# Patient Record
Sex: FEMALE | Race: WHITE | NOT HISPANIC OR LATINO | Employment: UNEMPLOYED | URBAN - METROPOLITAN AREA
[De-identification: names, ages, dates, MRNs, and addresses within clinical notes are randomized per-mention and may not be internally consistent; named-entity substitution may affect disease eponyms.]

---

## 2024-10-09 ENCOUNTER — PRE-ADMISSION TESTING (OUTPATIENT)
Dept: PREADMISSION TESTING | Facility: HOSPITAL | Age: 59
End: 2024-10-09
Payer: MEDICAID

## 2024-10-09 DIAGNOSIS — I67.1 CEREBRAL ANEURYSM, NONRUPTURED: ICD-10-CM

## 2024-10-09 LAB
ANION GAP SERPL CALCULATED.3IONS-SCNC: 13 MMOL/L (ref 5–15)
BUN SERPL-MCNC: 12 MG/DL (ref 6–20)
BUN/CREAT SERPL: 15 (ref 7–25)
CALCIUM SPEC-SCNC: 9.7 MG/DL (ref 8.6–10.5)
CHLORIDE SERPL-SCNC: 105 MMOL/L (ref 98–107)
CO2 SERPL-SCNC: 23 MMOL/L (ref 22–29)
CREAT SERPL-MCNC: 0.8 MG/DL (ref 0.57–1)
DEPRECATED RDW RBC AUTO: 41 FL (ref 37–54)
EGFRCR SERPLBLD CKD-EPI 2021: 86.1 ML/MIN/1.73
ERYTHROCYTE [DISTWIDTH] IN BLOOD BY AUTOMATED COUNT: 11.9 % (ref 12.3–15.4)
GLUCOSE SERPL-MCNC: 95 MG/DL (ref 65–99)
HBA1C MFR BLD: 5.1 % (ref 4.8–5.6)
HCT VFR BLD AUTO: 42.7 % (ref 34–46.6)
HGB BLD-MCNC: 14.4 G/DL (ref 12–15.9)
INR PPP: 0.92 (ref 0.89–1.12)
MCH RBC QN AUTO: 31.6 PG (ref 26.6–33)
MCHC RBC AUTO-ENTMCNC: 33.7 G/DL (ref 31.5–35.7)
MCV RBC AUTO: 93.6 FL (ref 79–97)
PLATELET # BLD AUTO: 267 10*3/MM3 (ref 140–450)
PMV BLD AUTO: 9.7 FL (ref 6–12)
POTASSIUM SERPL-SCNC: 4.1 MMOL/L (ref 3.5–5.2)
PROTHROMBIN TIME: 12.4 SECONDS (ref 12.2–14.5)
RBC # BLD AUTO: 4.56 10*6/MM3 (ref 3.77–5.28)
SODIUM SERPL-SCNC: 141 MMOL/L (ref 136–145)
WBC NRBC COR # BLD AUTO: 5.98 10*3/MM3 (ref 3.4–10.8)

## 2024-10-09 PROCEDURE — 85610 PROTHROMBIN TIME: CPT

## 2024-10-09 PROCEDURE — 83036 HEMOGLOBIN GLYCOSYLATED A1C: CPT

## 2024-10-09 PROCEDURE — 93005 ELECTROCARDIOGRAM TRACING: CPT

## 2024-10-09 PROCEDURE — 85027 COMPLETE CBC AUTOMATED: CPT

## 2024-10-09 PROCEDURE — 36415 COLL VENOUS BLD VENIPUNCTURE: CPT

## 2024-10-09 PROCEDURE — 80048 BASIC METABOLIC PNL TOTAL CA: CPT

## 2024-10-09 NOTE — DISCHARGE INSTRUCTIONS
"Dear Patient,    Do NOT eat, drink, or smoke after midnight the night before your procedure.   Take your medications as instructed by your doctor.    Glasses and jewelry may be worn, but dentures must be removed prior to your procedure.    Leave any items you consider valuable at home.      MORNING of your Procedure, please bring the following:     -Photo ID and insurance card(s)    -ALL medications in their ORIGINAL CONTAINERS    -Co-pay and/or deductible required by your insurance   -Copy of living will or power of  document (if not brought to    Pre-Admission Testing department)   -CPAP mask and tubing, not your machine (if applicable)    -Relaxation aids (music, books, magazines)   -Skin Prep Instruction Sheet (if applicable)   -Relaxation Aids    Check in on the 2nd floor in the [address] [facility] [address].  Your procedure will be performed in the cath lab or EP lab.  During your procedure, your family will wait in the cath lab waiting area where you checked in.      Need to make arrangements for transportation prior to discharge.    A handout regarding \"Heart Healthy Eating\" was provided today to encourage healthy eating habits.    Booklet published by Esther was given in Pre-Admission testing.  This booklet is for informational purposes only.  If you have any questions about your procedure, please speak with your physician.      Please note:  If you are scheduled to have one of the following procedures: Pulmonary Vein Ablation, Lead Extraction, MitraClip, Cerebral Coilings or Embolization, please let your family know that after your procedure you will be going to recovery unit on the 2nd floor of the [facility] [address].  When the physician is finished speaking with your family after your procedure is completed, your family will be directed or escorted to the surgery waiting area in the [facility] [address].  This is where your family will wait until you are given a room assignment and then your family will be directed to the " appropriate unit.

## 2024-10-09 NOTE — PAT
An arrival time for procedure was not provided during PAT visit. If patient had any questions or concerns about their arrival time, they were instructed to contact their surgeon/physician.  Additionally, if the patient referred to an arrival time that was acquired from their my chart account, patient was encouraged to verify that time with their surgeon/physician. Arrival times are NOT provided in Pre Admission Testing Department.    Patient to apply Chlorhexadine wipes  to surgical area (as instructed) the night before procedure and the AM of procedure. Wipes provided.    Patient denies any current skin issues.     Patient viewed general PAT education video as instructed in their preoperative information received from their surgeon.  Patient stated the general PAT education video was viewed in its entirety and survey completed.  Copies of PAT general education handouts (Incentive Spirometry, Meds to Beds Program, Patient Belongings, Pre-op skin preparation instructions, Blood Glucose testing, Visitor policy, Surgery FAQ, Code H) distributed to patient if not printed. Education related to the PAT pass and skin preparation for surgery (if applicable) completed in PAT as a reinforcement to PAT education video. Patient instructed to return PAT pass provided today as well as completed skin preparation sheet (if applicable) on the day of procedure.     Additionally if patient had not viewed video yet but intended to view it at home or in our waiting area, then referred them to the handout with QR code/link provided during PAT visit.  Encouraged patient/family to read PAT general education handouts thoroughly and notify PAT staff with any questions or concerns. Patient verbalized understanding of all information and priority content.    PATIENT EKG ON CHART AND IN EPIC FROM 10/09/2024

## 2024-10-10 LAB
QT INTERVAL: 412 MS
QTC INTERVAL: 447 MS

## 2024-10-10 RX ORDER — CLOPIDOGREL BISULFATE 75 MG/1
75 TABLET ORAL DAILY
Qty: 90 TABLET | Refills: 1 | Status: CANCELLED | OUTPATIENT
Start: 2024-10-10

## 2024-10-10 NOTE — TELEPHONE ENCOUNTER
Provider: Shon    Surgery/Procedure:  Procedure with Hilton Menendez MD (10/15/2024)     Last visit:   Office Visit with Hilton Menendez MD (09/24/2024)   Next visit: Procedure with Hilton Menendez MD (10/15/2024)      Reason for call: Pt LVM stating that she has misplaced her prescription bottle for Plavix and is requesting another prescription be sent to pharmacy    Reason for call:         Requested Prescriptions     Pending Prescriptions Disp Refills    clopidogrel (Plavix) 75 MG tablet 90 tablet 1     Sig: Take 1 tablet by mouth Daily.      Spoke with [facility] in [place], [place] and explained the situation. Was told by pharmacist that patient has a refill and can pick it up but would have to pay for it out of pocket as her insurance will not cover it. Called pt and informed her that she will need to talk to Pharmacy regarding getting medication refilled.Pt verbalized understanding and was thankful for the return call.

## 2024-10-10 NOTE — TELEPHONE ENCOUNTER
Pt is aware of importance of taking medication per our previous discussion and will call back if she has any issues with the pharmacy filling medication.

## 2024-10-15 ENCOUNTER — HOSPITAL ENCOUNTER (INPATIENT)
Facility: HOSPITAL | Age: 59
LOS: 1 days | Discharge: HOME | End: 2024-10-16
Attending: STUDENT IN AN ORGANIZED HEALTH CARE EDUCATION/TRAINING PROGRAM | Admitting: STUDENT IN AN ORGANIZED HEALTH CARE EDUCATION/TRAINING PROGRAM
Payer: MEDICAID

## 2024-10-15 ENCOUNTER — ANESTHESIA EVENT (OUTPATIENT)
Dept: CARDIOLOGY | Facility: HOSPITAL | Age: 59
End: 2024-10-15
Payer: MEDICAID

## 2024-10-15 ENCOUNTER — ANESTHESIA (OUTPATIENT)
Dept: CARDIOLOGY | Facility: HOSPITAL | Age: 59
End: 2024-10-15
Payer: MEDICAID

## 2024-10-15 DIAGNOSIS — I67.1 CEREBRAL ANEURYSM, NONRUPTURED: ICD-10-CM

## 2024-10-15 LAB
ANION GAP SERPL CALCULATED.3IONS-SCNC: 9 MMOL/L (ref 5–15)
BUN SERPL-MCNC: 12 MG/DL (ref 6–20)
BUN/CREAT SERPL: 18.5 (ref 7–25)
CALCIUM SPEC-SCNC: 8.4 MG/DL (ref 8.6–10.5)
CHLORIDE SERPL-SCNC: 109 MMOL/L (ref 98–107)
CO2 SERPL-SCNC: 21 MMOL/L (ref 22–29)
CREAT SERPL-MCNC: 0.65 MG/DL (ref 0.57–1)
EGFRCR SERPLBLD CKD-EPI 2021: 102.8 ML/MIN/1.73
GLUCOSE SERPL-MCNC: 127 MG/DL (ref 65–99)
HCT VFR BLD AUTO: 37.1 % (ref 34–46.6)
HGB BLD-MCNC: 12.9 G/DL (ref 12–15.9)
PA ADP PRP-ACNC: 22 PRU
PA ADP PRP-ACNC: 34 PRU
POTASSIUM SERPL-SCNC: 4.6 MMOL/L (ref 3.5–5.2)
SODIUM SERPL-SCNC: 139 MMOL/L (ref 136–145)

## 2024-10-15 PROCEDURE — S0260 H&P FOR SURGERY: HCPCS | Performed by: STUDENT IN AN ORGANIZED HEALTH CARE EDUCATION/TRAINING PROGRAM

## 2024-10-15 PROCEDURE — 25010000002 PHENYLEPHRINE 10 MG/ML SOLUTION: Performed by: NURSE ANESTHETIST, CERTIFIED REGISTERED

## 2024-10-15 PROCEDURE — 61624 TCAT PERM OCCLS/EMBOLJ CNS: CPT | Performed by: STUDENT IN AN ORGANIZED HEALTH CARE EDUCATION/TRAINING PROGRAM

## 2024-10-15 PROCEDURE — C1894 INTRO/SHEATH, NON-LASER: HCPCS | Performed by: STUDENT IN AN ORGANIZED HEALTH CARE EDUCATION/TRAINING PROGRAM

## 2024-10-15 PROCEDURE — 76377 3D RENDER W/INTRP POSTPROCES: CPT | Performed by: STUDENT IN AN ORGANIZED HEALTH CARE EDUCATION/TRAINING PROGRAM

## 2024-10-15 PROCEDURE — 25010000002 PHENYLEPHRINE 10 MG/ML SOLUTION 1 ML VIAL: Performed by: NURSE ANESTHETIST, CERTIFIED REGISTERED

## 2024-10-15 PROCEDURE — 25010000002 DEXAMETHASONE PER 1 MG: Performed by: NURSE ANESTHETIST, CERTIFIED REGISTERED

## 2024-10-15 PROCEDURE — C1769 GUIDE WIRE: HCPCS | Performed by: STUDENT IN AN ORGANIZED HEALTH CARE EDUCATION/TRAINING PROGRAM

## 2024-10-15 PROCEDURE — 25010000002 LIDOCAINE PF 1% 1 % SOLUTION: Performed by: NURSE ANESTHETIST, CERTIFIED REGISTERED

## 2024-10-15 PROCEDURE — 25810000003 SODIUM CHLORIDE 0.9 % SOLUTION: Performed by: STUDENT IN AN ORGANIZED HEALTH CARE EDUCATION/TRAINING PROGRAM

## 2024-10-15 PROCEDURE — 75898 FOLLOW-UP ANGIOGRAPHY: CPT | Performed by: STUDENT IN AN ORGANIZED HEALTH CARE EDUCATION/TRAINING PROGRAM

## 2024-10-15 PROCEDURE — 25010000002 PROPOFOL 10 MG/ML EMULSION: Performed by: NURSE ANESTHETIST, CERTIFIED REGISTERED

## 2024-10-15 PROCEDURE — C1760 CLOSURE DEV, VASC: HCPCS | Performed by: STUDENT IN AN ORGANIZED HEALTH CARE EDUCATION/TRAINING PROGRAM

## 2024-10-15 PROCEDURE — 25010000002 SUGAMMADEX 200 MG/2ML SOLUTION: Performed by: NURSE ANESTHETIST, CERTIFIED REGISTERED

## 2024-10-15 PROCEDURE — 25010000002 FENTANYL CITRATE (PF) 50 MCG/ML SOLUTION: Performed by: NURSE ANESTHETIST, CERTIFIED REGISTERED

## 2024-10-15 PROCEDURE — 25010000002 HEPARIN (PORCINE) PER 1000 UNITS: Performed by: STUDENT IN AN ORGANIZED HEALTH CARE EDUCATION/TRAINING PROGRAM

## 2024-10-15 PROCEDURE — C1887 CATHETER, GUIDING: HCPCS | Performed by: STUDENT IN AN ORGANIZED HEALTH CARE EDUCATION/TRAINING PROGRAM

## 2024-10-15 PROCEDURE — 25810000003 SODIUM CHLORIDE 0.9 % SOLUTION 250 ML FLEX CONT: Performed by: NURSE ANESTHETIST, CERTIFIED REGISTERED

## 2024-10-15 PROCEDURE — 99222 1ST HOSP IP/OBS MODERATE 55: CPT | Performed by: INTERNAL MEDICINE

## 2024-10-15 PROCEDURE — 0 IODIXANOL PER 1 ML: Performed by: STUDENT IN AN ORGANIZED HEALTH CARE EDUCATION/TRAINING PROGRAM

## 2024-10-15 PROCEDURE — 85576 BLOOD PLATELET AGGREGATION: CPT | Performed by: STUDENT IN AN ORGANIZED HEALTH CARE EDUCATION/TRAINING PROGRAM

## 2024-10-15 PROCEDURE — 85018 HEMOGLOBIN: CPT | Performed by: STUDENT IN AN ORGANIZED HEALTH CARE EDUCATION/TRAINING PROGRAM

## 2024-10-15 PROCEDURE — 75894 X-RAYS TRANSCATH THERAPY: CPT | Performed by: STUDENT IN AN ORGANIZED HEALTH CARE EDUCATION/TRAINING PROGRAM

## 2024-10-15 PROCEDURE — 03VK3HZ RESTRICTION OF RIGHT INTERNAL CAROTID ARTERY WITH INTRALUMINAL DEVICE, FLOW DIVERTER, PERCUTANEOUS APPROACH: ICD-10-PCS | Performed by: STUDENT IN AN ORGANIZED HEALTH CARE EDUCATION/TRAINING PROGRAM

## 2024-10-15 PROCEDURE — C1766 INTRO/SHEATH,STRBLE,NON-PEEL: HCPCS | Performed by: STUDENT IN AN ORGANIZED HEALTH CARE EDUCATION/TRAINING PROGRAM

## 2024-10-15 PROCEDURE — 36224 PLACE CATH CAROTD ART: CPT | Performed by: STUDENT IN AN ORGANIZED HEALTH CARE EDUCATION/TRAINING PROGRAM

## 2024-10-15 PROCEDURE — 85014 HEMATOCRIT: CPT | Performed by: STUDENT IN AN ORGANIZED HEALTH CARE EDUCATION/TRAINING PROGRAM

## 2024-10-15 PROCEDURE — 80048 BASIC METABOLIC PNL TOTAL CA: CPT | Performed by: STUDENT IN AN ORGANIZED HEALTH CARE EDUCATION/TRAINING PROGRAM

## 2024-10-15 PROCEDURE — B3161ZZ FLUOROSCOPY OF RIGHT INTERNAL CAROTID ARTERY USING LOW OSMOLAR CONTRAST: ICD-10-PCS | Performed by: STUDENT IN AN ORGANIZED HEALTH CARE EDUCATION/TRAINING PROGRAM

## 2024-10-15 PROCEDURE — 25010000002 ONDANSETRON PER 1 MG: Performed by: NURSE ANESTHETIST, CERTIFIED REGISTERED

## 2024-10-15 RX ORDER — MIRTAZAPINE 15 MG/1
15 TABLET, FILM COATED ORAL NIGHTLY
Status: DISCONTINUED | OUTPATIENT
Start: 2024-10-15 | End: 2024-10-16 | Stop reason: HOSPADM

## 2024-10-15 RX ORDER — HEPARIN SODIUM 1000 [USP'U]/ML
INJECTION, SOLUTION INTRAVENOUS; SUBCUTANEOUS
Status: DISCONTINUED | OUTPATIENT
Start: 2024-10-15 | End: 2024-10-15 | Stop reason: HOSPADM

## 2024-10-15 RX ORDER — PHENYLEPHRINE HYDROCHLORIDE 10 MG/ML
INJECTION INTRAVENOUS AS NEEDED
Status: DISCONTINUED | OUTPATIENT
Start: 2024-10-15 | End: 2024-10-15 | Stop reason: SURG

## 2024-10-15 RX ORDER — DROPERIDOL 2.5 MG/ML
0.62 INJECTION, SOLUTION INTRAMUSCULAR; INTRAVENOUS ONCE AS NEEDED
Status: DISCONTINUED | OUTPATIENT
Start: 2024-10-15 | End: 2024-10-15 | Stop reason: HOSPADM

## 2024-10-15 RX ORDER — FENTANYL CITRATE 50 UG/ML
50 INJECTION, SOLUTION INTRAMUSCULAR; INTRAVENOUS
Status: DISCONTINUED | OUTPATIENT
Start: 2024-10-15 | End: 2024-10-15 | Stop reason: HOSPADM

## 2024-10-15 RX ORDER — PROPOFOL 10 MG/ML
VIAL (ML) INTRAVENOUS AS NEEDED
Status: DISCONTINUED | OUTPATIENT
Start: 2024-10-15 | End: 2024-10-15 | Stop reason: SURG

## 2024-10-15 RX ORDER — ONDANSETRON 2 MG/ML
4 INJECTION INTRAMUSCULAR; INTRAVENOUS EVERY 6 HOURS PRN
Status: DISCONTINUED | OUTPATIENT
Start: 2024-10-15 | End: 2024-10-16 | Stop reason: HOSPADM

## 2024-10-15 RX ORDER — VENLAFAXINE HYDROCHLORIDE 37.5 MG/1
37.5 CAPSULE, EXTENDED RELEASE ORAL DAILY
Status: DISCONTINUED | OUTPATIENT
Start: 2024-10-16 | End: 2024-10-16 | Stop reason: HOSPADM

## 2024-10-15 RX ORDER — ASPIRIN 81 MG/1
TABLET, CHEWABLE ORAL
Status: COMPLETED
Start: 2024-10-15 | End: 2024-10-15

## 2024-10-15 RX ORDER — NITROGLYCERIN 0.4 MG/1
0.4 TABLET SUBLINGUAL
Status: DISCONTINUED | OUTPATIENT
Start: 2024-10-15 | End: 2024-10-16 | Stop reason: HOSPADM

## 2024-10-15 RX ORDER — IODIXANOL 320 MG/ML
INJECTION, SOLUTION INTRAVASCULAR
Status: DISCONTINUED | OUTPATIENT
Start: 2024-10-15 | End: 2024-10-15 | Stop reason: HOSPADM

## 2024-10-15 RX ORDER — ASPIRIN 81 MG/1
81 TABLET, CHEWABLE ORAL DAILY
Status: DISCONTINUED | OUTPATIENT
Start: 2024-10-15 | End: 2024-10-16 | Stop reason: HOSPADM

## 2024-10-15 RX ORDER — PANTOPRAZOLE SODIUM 40 MG/1
40 TABLET, DELAYED RELEASE ORAL DAILY
Status: DISCONTINUED | OUTPATIENT
Start: 2024-10-16 | End: 2024-10-16 | Stop reason: HOSPADM

## 2024-10-15 RX ORDER — ONDANSETRON 2 MG/ML
INJECTION INTRAMUSCULAR; INTRAVENOUS AS NEEDED
Status: DISCONTINUED | OUTPATIENT
Start: 2024-10-15 | End: 2024-10-15 | Stop reason: SURG

## 2024-10-15 RX ORDER — CETIRIZINE HYDROCHLORIDE 10 MG/1
10 TABLET ORAL DAILY
Status: DISCONTINUED | OUTPATIENT
Start: 2024-10-15 | End: 2024-10-16 | Stop reason: HOSPADM

## 2024-10-15 RX ORDER — DEXAMETHASONE SODIUM PHOSPHATE 4 MG/ML
INJECTION, SOLUTION INTRA-ARTICULAR; INTRALESIONAL; INTRAMUSCULAR; INTRAVENOUS; SOFT TISSUE AS NEEDED
Status: DISCONTINUED | OUTPATIENT
Start: 2024-10-15 | End: 2024-10-15 | Stop reason: SURG

## 2024-10-15 RX ORDER — OXYCODONE AND ACETAMINOPHEN 10; 325 MG/1; MG/1
1 TABLET ORAL 3 TIMES DAILY
Status: DISCONTINUED | OUTPATIENT
Start: 2024-10-15 | End: 2024-10-16 | Stop reason: HOSPADM

## 2024-10-15 RX ORDER — ROCURONIUM BROMIDE 10 MG/ML
INJECTION, SOLUTION INTRAVENOUS AS NEEDED
Status: DISCONTINUED | OUTPATIENT
Start: 2024-10-15 | End: 2024-10-15 | Stop reason: SURG

## 2024-10-15 RX ORDER — SODIUM CHLORIDE 9 MG/ML
100 INJECTION, SOLUTION INTRAVENOUS CONTINUOUS
Status: DISCONTINUED | OUTPATIENT
Start: 2024-10-15 | End: 2024-10-16 | Stop reason: HOSPADM

## 2024-10-15 RX ORDER — DIAZEPAM 5 MG
5 TABLET ORAL NIGHTLY
Status: DISCONTINUED | OUTPATIENT
Start: 2024-10-15 | End: 2024-10-16 | Stop reason: HOSPADM

## 2024-10-15 RX ORDER — CLOPIDOGREL BISULFATE 75 MG/1
75 TABLET ORAL DAILY
Status: DISCONTINUED | OUTPATIENT
Start: 2024-10-15 | End: 2024-10-16 | Stop reason: HOSPADM

## 2024-10-15 RX ORDER — SODIUM CHLORIDE 9 MG/ML
40 INJECTION, SOLUTION INTRAVENOUS AS NEEDED
Status: DISCONTINUED | OUTPATIENT
Start: 2024-10-15 | End: 2024-10-15

## 2024-10-15 RX ORDER — AMOXICILLIN 250 MG
2 CAPSULE ORAL 2 TIMES DAILY
Status: DISCONTINUED | OUTPATIENT
Start: 2024-10-15 | End: 2024-10-16 | Stop reason: HOSPADM

## 2024-10-15 RX ORDER — CLOPIDOGREL BISULFATE 75 MG/1
TABLET ORAL
Status: COMPLETED
Start: 2024-10-15 | End: 2024-10-15

## 2024-10-15 RX ORDER — SUCRALFATE 1 G/1
1 TABLET ORAL 4 TIMES DAILY
Status: DISCONTINUED | OUTPATIENT
Start: 2024-10-15 | End: 2024-10-16 | Stop reason: HOSPADM

## 2024-10-15 RX ORDER — LIDOCAINE HYDROCHLORIDE 10 MG/ML
INJECTION, SOLUTION EPIDURAL; INFILTRATION; INTRACAUDAL; PERINEURAL AS NEEDED
Status: DISCONTINUED | OUTPATIENT
Start: 2024-10-15 | End: 2024-10-15 | Stop reason: SURG

## 2024-10-15 RX ORDER — SODIUM CHLORIDE 0.9 % (FLUSH) 0.9 %
1-10 SYRINGE (ML) INJECTION AS NEEDED
Status: DISCONTINUED | OUTPATIENT
Start: 2024-10-15 | End: 2024-10-15

## 2024-10-15 RX ORDER — HYDROMORPHONE HYDROCHLORIDE 1 MG/ML
0.5 INJECTION, SOLUTION INTRAMUSCULAR; INTRAVENOUS; SUBCUTANEOUS
Status: DISCONTINUED | OUTPATIENT
Start: 2024-10-15 | End: 2024-10-15 | Stop reason: HOSPADM

## 2024-10-15 RX ORDER — LEVOTHYROXINE SODIUM 25 UG/1
25 TABLET ORAL EVERY MORNING
Status: DISCONTINUED | OUTPATIENT
Start: 2024-10-16 | End: 2024-10-16 | Stop reason: HOSPADM

## 2024-10-15 RX ORDER — ACETAMINOPHEN 325 MG/1
650 TABLET ORAL EVERY 4 HOURS PRN
Status: DISCONTINUED | OUTPATIENT
Start: 2024-10-15 | End: 2024-10-16 | Stop reason: HOSPADM

## 2024-10-15 RX ORDER — SODIUM CHLORIDE 0.9 % (FLUSH) 0.9 %
10 SYRINGE (ML) INJECTION EVERY 12 HOURS SCHEDULED
Status: DISCONTINUED | OUTPATIENT
Start: 2024-10-15 | End: 2024-10-15

## 2024-10-15 RX ORDER — DIAZEPAM 5 MG
5 TABLET ORAL EVERY MORNING
Status: DISCONTINUED | OUTPATIENT
Start: 2024-10-16 | End: 2024-10-15

## 2024-10-15 RX ORDER — ATORVASTATIN CALCIUM 10 MG/1
10 TABLET, FILM COATED ORAL DAILY
Status: DISCONTINUED | OUTPATIENT
Start: 2024-10-16 | End: 2024-10-16 | Stop reason: HOSPADM

## 2024-10-15 RX ORDER — FENTANYL CITRATE 50 UG/ML
INJECTION, SOLUTION INTRAMUSCULAR; INTRAVENOUS AS NEEDED
Status: DISCONTINUED | OUTPATIENT
Start: 2024-10-15 | End: 2024-10-15 | Stop reason: SURG

## 2024-10-15 RX ORDER — ESCITALOPRAM OXALATE 20 MG/1
20 TABLET ORAL DAILY
Status: DISCONTINUED | OUTPATIENT
Start: 2024-10-16 | End: 2024-10-16 | Stop reason: HOSPADM

## 2024-10-15 RX ADMIN — ASPIRIN 81 MG 81 MG: 81 TABLET ORAL at 13:30

## 2024-10-15 RX ADMIN — SUGAMMADEX 200 MG: 100 INJECTION, SOLUTION INTRAVENOUS at 12:39

## 2024-10-15 RX ADMIN — ROCURONIUM BROMIDE 50 MG: 10 SOLUTION INTRAVENOUS at 11:28

## 2024-10-15 RX ADMIN — FENTANYL CITRATE 50 MCG: 50 INJECTION, SOLUTION INTRAMUSCULAR; INTRAVENOUS at 11:32

## 2024-10-15 RX ADMIN — SUCRALFATE 1 G: 1 TABLET ORAL at 21:01

## 2024-10-15 RX ADMIN — ONDANSETRON 4 MG: 2 INJECTION INTRAMUSCULAR; INTRAVENOUS at 12:39

## 2024-10-15 RX ADMIN — SUCRALFATE 1 G: 1 TABLET ORAL at 18:13

## 2024-10-15 RX ADMIN — OXYCODONE AND ACETAMINOPHEN 1 TABLET: 10; 325 TABLET ORAL at 18:14

## 2024-10-15 RX ADMIN — LIDOCAINE HYDROCHLORIDE 50 MG: 10 INJECTION, SOLUTION EPIDURAL; INFILTRATION; INTRACAUDAL; PERINEURAL at 11:28

## 2024-10-15 RX ADMIN — OXYCODONE AND ACETAMINOPHEN 1 TABLET: 10; 325 TABLET ORAL at 21:01

## 2024-10-15 RX ADMIN — MIRTAZAPINE 15 MG: 15 TABLET, FILM COATED ORAL at 21:01

## 2024-10-15 RX ADMIN — ROCURONIUM BROMIDE 20 MG: 10 SOLUTION INTRAVENOUS at 11:57

## 2024-10-15 RX ADMIN — SODIUM CHLORIDE: 9 INJECTION, SOLUTION INTRAVENOUS at 12:43

## 2024-10-15 RX ADMIN — CETIRIZINE HYDROCHLORIDE 10 MG: 10 TABLET, FILM COATED ORAL at 18:13

## 2024-10-15 RX ADMIN — PROPOFOL 150 MG: 10 INJECTION, EMULSION INTRAVENOUS at 11:28

## 2024-10-15 RX ADMIN — DIAZEPAM 5 MG: 5 TABLET ORAL at 22:39

## 2024-10-15 RX ADMIN — DEXAMETHASONE SODIUM PHOSPHATE 4 MG: 4 INJECTION, SOLUTION INTRAMUSCULAR; INTRAVENOUS at 11:48

## 2024-10-15 RX ADMIN — CLOPIDOGREL BISULFATE 75 MG: 75 TABLET ORAL at 13:30

## 2024-10-15 RX ADMIN — SENNOSIDES AND DOCUSATE SODIUM 2 TABLET: 50; 8.6 TABLET ORAL at 21:01

## 2024-10-15 RX ADMIN — TRAZODONE HYDROCHLORIDE 150 MG: 50 TABLET ORAL at 21:01

## 2024-10-15 RX ADMIN — PHENYLEPHRINE HYDROCHLORIDE 40 MCG: 10 INJECTION INTRAVENOUS at 11:48

## 2024-10-15 RX ADMIN — HEPARIN SODIUM 7000 UNITS: 1000 INJECTION INTRAVENOUS; SUBCUTANEOUS at 12:04

## 2024-10-15 RX ADMIN — PHENYLEPHRINE HYDROCHLORIDE 0.2 MCG/KG/MIN: 10 INJECTION INTRAVENOUS at 11:48

## 2024-10-15 NOTE — PROGRESS NOTES
INTENSIVIST   PROGRESS NOTE         SUBJECTIVE     Ms. Johanne Reyes, 59 y.o. female is followed for: No chief complaint on file.       S/P Procedure(s) (LRB):  Embolization (N/A)   Perioperative medical management of comorbid conditions, including glycemic control and electrolytes disorders.    As an Intensivist, we provide an integrated approach to the ICU patient and family, medical management of comorbid conditions, including but not limited to electrolytes, glycemic control, organ dysfunction, lead interdisciplinary rounds and coordinate the care with all other services, including those from other specialists.     Interval History:  POD: Day of Surgery    She was seen in 2B ICU upon arrival from PACU.    She was admitted on 10/15/2024 for an embolization due to the presence of an unruptured cerebral aneurysm which was diagnosed during a cerebral angiogram done on 09/17/24.    Events from surgery were reviewed.    At present, she is doing well.   Comfortable.   No distress.   No chest pain.    Temp  Min: 97.1 °F (36.2 °C)  Max: 98.5 °F (36.9 °C)     PMH: She  has a past medical history of Aneurysm (07/2024), Arthritis, Hashimoto's disease, Hyperlipidemia, and Hypertension.   PSxH: She  has a past surgical history that includes Laparoscopic total hysterectomy; Stomach surgery; Foot surgery (Bilateral); Appendectomy; Uvulectomy; Interventional radiology procedure (Bilateral, 09/17/2024); and Colonoscopy.      Medications:  No current facility-administered medications on file prior to encounter.     Current Outpatient Medications on File Prior to Encounter   Medication Sig    albuterol (PROVENTIL) (2.5 MG/3ML) 0.083% nebulizer solution INHALE THE CONTENTS OF 1 VIAL BY NEBULIZATION ROUTE EVERY 4-6 HOURS AS NEEDED FOR WHEEZING.    albuterol sulfate  (90 Base) MCG/ACT inhaler INHALE 1 TO 2 PUFFS BY MOUTH FOUR TIMES DAILY AS NEEDED FOR SHORTNESS OF BREATH    Clindamycin Phosphate, 1 Dose, (Clindesse) 2 %  vaginal cream INSERT 1 APPLICATORFUL VAGINALLY ONCE FOR 1 DAY    clopidogrel (Plavix) 75 MG tablet Take 1 tablet by mouth Daily.    cyclobenzaprine (FLEXERIL) 10 MG tablet Take 1 tablet by mouth As Needed for Muscle Spasms.    diazePAM (VALIUM) 5 MG tablet Take 1 tablet by mouth Every Morning.    escitalopram (LEXAPRO) 20 MG tablet Take 1 tablet by mouth Daily.    estradiol (ESTRACE) 0.1 MG/GM vaginal cream insert 2-3 x a week    Estrogens Conjugated (Premarin) 0.625 MG/GM vaginal cream     levothyroxine (SYNTHROID, LEVOTHROID) 25 MCG tablet Take 1 tablet by mouth Every Morning.    lovastatin (MEVACOR) 20 MG tablet Take 1 tablet by mouth Daily.    metoprolol tartrate (LOPRESSOR) 25 MG tablet Take 1 tablet by mouth As Needed. For HR > 100    mirtazapine (REMERON) 15 MG tablet Take 1 tablet by mouth Every Night.    nitroglycerin (NITROSTAT) 0.4 MG SL tablet DISSOLVE ONE (1) TABLET(S) BY MOUTH UNDER TONGUE EVERY 5 MINUTES AS NEEDED FOR CHEST PAIN; IF PAIN PERSISTS AFTER 3 TABS IN 15 MIN, CALL 91    oxyCODONE-acetaminophen (PERCOCET)  MG per tablet Take 1 tablet by mouth 3 (Three) Times a Day.    pantoprazole (PROTONIX) 40 MG EC tablet Take 1 tablet by mouth Daily.    promethazine (PHENERGAN) 12.5 MG tablet Take 1 tablet by mouth As Needed for Nausea.    sucralfate (CARAFATE) 1 g tablet Take 1 tablet by mouth 4 (Four) Times a Day. 1 hour before meals    traZODone (DESYREL) 150 MG tablet Take 1 tablet by mouth Every Night.    venlafaxine XR (EFFEXOR-XR) 37.5 MG 24 hr capsule Take 1 capsule by mouth Daily.    fexofenadine (ALLEGRA) 180 MG tablet Take 1 tablet by mouth Daily.    mupirocin (BACTROBAN) 2 % ointment Apply 1 Application topically to the appropriate area as directed As Needed.    naloxone (NARCAN) 4 MG/0.1ML nasal spray Administer 1 spray into the nostril(s) as directed by provider As Needed for Opioid Reversal.    predniSONE (DELTASONE) 10 MG tablet Take 1 tablet by mouth As Needed.       Allergies: She  is allergic to sulfa antibiotics, wound dressing adhesive, adhesive tape, amoxicillin-pot clavulanate, methotrexate derivatives, metronidazole, pregabalin, sulfamethoxazole-trimethoprim, terbinafine, trimethoprim, vancomycin, 5ht3 receptor antagonists, amitriptyline, buprenorphine-naloxone, butorphanol, cefdinir, and gabapentin.   FH: Her family history is not on file.   SH: She  reports that she has been smoking cigarettes. She has been exposed to tobacco smoke. She has never used smokeless tobacco. She reports that she does not currently use alcohol. She reports that she does not use drugs.     The patient's relevant past medical, surgical and social history were reviewed and updated in Epic as appropriate.       History     Last Reviewed by Fran Carrillo MD on 10/15/2024 at  5:06 PM    Sections Reviewed    Medical, Surgical, Family, Tobacco, Alcohol, Drug Use, Sexual Activity,   Social Documentation    Problem list reviewed by Fran Carrillo MD on 10/15/2024 at  5:06 PM  Medicines reviewed by Fran Carrillo MD on 10/15/2024 at  5:06 PM  Allergies reviewed by Fran Carrillo MD on 10/15/2024 at  5:06 PM       ROS:   As per HPI        OBJECTIVE     Vitals:  Temp: 98.1 °F (36.7 °C) (10/15/24 1605) Temp  Min: 97.1 °F (36.2 °C)  Max: 98.5 °F (36.9 °C)   Temp core:      BP: 97/45 (10/15/24 1630) BP  Min: 97/45  Max: 128/70   MAP (non-invasive) Noninvasive MAP (mmHg): 63 (10/15/24 1630) Noninvasive MAP (mmHg)  Av.4  Min: 63  Max: 96   Pulse: 81 (10/15/24 1630) Pulse  Min: 64  Max: 97   Resp: 14 (10/15/24 1605) Resp  Min: 12  Max: 18   SpO2: 96 % (10/15/24 1630) SpO2  Min: 93 %  Max: 98 %   Device: room air (10/15/24 1605)    Flow Rate: 2 (10/15/24 1330) Flow (L/min) (Oxygen Therapy)  Min: 2  Max: 2     There were no vitals filed for this visit.     Intake/Ouptut 24 hrs (7:00AM - 6:59 AM)  Intake & Output (last 2 days)         10/13 0701  10/14 0700 10/14 0701  10/15 0700 10/15 0701  10/16 0700    I.V.   400     Total Intake   400    Blood   5    Total Output   5    Net   +395                   Medications (drips):  niCARdipine  sodium chloride, Last Rate: 100 mL/hr (10/15/24 1117)        Physical Examination  Telemetry:  Rhythm: normal sinus rhythm (10/15/24 1530)      Constitutional:  No acute distress.   Cardiovascular: RRR.    Respiratory: Normal breath sounds  No adventitious sounds   Abdominal:  Soft with no tenderness.   Extremities: No Edema   Neurological:   Alert, Oriented, Cooperative.    Best Eye Response: 4-->(E4) spontaneous (10/15/24 1700)  Best Motor Response: 6-->(M6) obeys commands (10/15/24 1700)  Best Verbal Response: 5-->(V5) oriented (10/15/24 1700)  Santino Coma Scale Score: 15 (10/15/24 1700)               Hematology:  Results from last 7 days   Lab Units 10/09/24  1508   WBC 10*3/mm3 5.98   HEMOGLOBIN g/dL 14.4   MCV fL 93.6   PLATELETS 10*3/mm3 267         Chemistry:  Estimated Creatinine Clearance: 82.7 mL/min (by C-G formula based on SCr of 0.8 mg/dL).    Results from last 7 days   Lab Units 10/09/24  1508   SODIUM mmol/L 141   POTASSIUM mmol/L 4.1   CHLORIDE mmol/L 105   CO2 mmol/L 23.0   BUN mg/dL 12   CREATININE mg/dL 0.80   GLUCOSE mg/dL 95     Results from last 7 days   Lab Units 10/09/24  1508   CALCIUM mg/dL 9.7     Images:  No radiology results for the last day    Echo:      Results: Reviewed.  I reviewed the patient's new laboratory and imaging results.  I independently reviewed the patient's new images.    Medications: Reviewed.      Assessment   A/P     Hospital:  LOS: 0 days   ICU: 1h     Active Hospital Problems    Diagnosis  POA    **Cerebral aneurysm, nonruptured [I67.1]  Unknown    Cerebral aneurysm [I67.1]  Yes     Johanne is a 59 y.o. 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  Procedure(s) (LRB):  Embolization (N/A)   Surgeon   Hilton Menendez, (Neurosurgery)   10/15/24     Pulmonary  Tobacco use 0.25 ppd x 30 years.  Cardiovascular  HTN  Dyslipidemia   Rheumatology  SLE  Rheumatoid Arthritis  GI/Hepatology  GERD  Endocrine   BMI 22.64 Normal: 18.5-24.9kg/m2  Hashimoto's disease. ? Treatment with levothyroxine 25 mcg/d  No history of Diabetes    Lab Results   Lab Value Date/Time    HGBA1C 5.10 10/09/2024 1508           Diet: Diet: Regular/House; Fluid Consistency: Thin (IDDSI 0)  No active supplement orders      Advance Directives: Code Status and Medical Interventions: CPR (Attempt to Resuscitate); Full Support   Ordered at: 10/15/24 1556     Code Status (Patient has no pulse and is not breathing):    CPR (Attempt to Resuscitate)     Medical Interventions (Patient has pulse or is breathing):    Full Support        VTE Prophylaxis:  Mechanical VTE prophylaxis orders are present.          In brief:  ICU post operative medical management.  Hemodynamic management. Adequate preload.  Nicardipine continuous intravenous infusion as clinically indicated.  Urinary Output > = 0.5 mL/kg/hr  Watch for arrhythmias.  Watch for bleeding.  Insulin Sub Q as clinically indicated. Avoid oral antidiabetic medications. Target glucose < = 180 mg/dL.   Disposition: Admit to ICU    Plan of care and goals reviewed during interdisciplinary rounds.  I discussed the patient's findings and my recommendations with patient and nursing staff    MDM:    Problem(s) High due to: Acute or Chronic illness or injury that may poses a threat to life or bodily function  Data: Moderate due to: Review of prior external records from each unique source, Review or results of each unique test, and Ordering of each unique test    Moderate    [] Primary Attending Intensive Care Medicine - Nutrition Support   [x] Consultant    Copied text in this note has been reviewed and is accurate as of 10/15/24

## 2024-10-15 NOTE — H&P
"Patient: Johanne Reyes  : 1965    Primary Care Provider: Serge Fu MD    Requesting Provider: As above      Chief Complaint: Cerebral aneurysm    History of Present Illness: This is a 59 y.o. female who presents for embolization of an unruptured cerebral aneurysm.  Please see prior notes for full history.    PMHX  Allergies:  Allergies   Allergen Reactions    Sulfa Antibiotics Hives    Wound Dressing Adhesive Rash    Adhesive Tape Itching    Amoxicillin-Pot Clavulanate Other (See Comments) and Nausea Only    Methotrexate Derivatives Other (See Comments)     \"Makes my pancreas growl\"    Metronidazole Nausea Only    Pregabalin Other (See Comments) and Confusion    Sulfamethoxazole-Trimethoprim Hives and Other (See Comments)    Terbinafine Unknown - High Severity     Nostril bleeding    Trimethoprim Other (See Comments)    Vancomycin Confusion and Other (See Comments)    5ht3 Receptor Antagonists Anxiety    Amitriptyline Anxiety    Buprenorphine-Naloxone Anxiety and Other (See Comments)    Butorphanol Anxiety and Other (See Comments)    Cefdinir Anxiety, Nausea Only and Other (See Comments)    Gabapentin Anxiety and Other (See Comments)     Medications    Current Facility-Administered Medications:     sodium chloride 0.9 % flush 1-10 mL, 1-10 mL, Intravenous, PRN, Hilton Menendez MD    sodium chloride 0.9 % flush 10 mL, 10 mL, Intravenous, Q12H, Hilton Menendez MD    sodium chloride 0.9 % infusion 40 mL, 40 mL, Intravenous, PRN, Hilton Menendez MD    sodium chloride 0.9 % infusion, 100 mL/hr, Intravenous, Continuous, Hilton Menendez MD, Last Rate: 100 mL/hr at 10/15/24 1022, 100 mL/hr at 10/15/24 1022  Past Medical History:  Past Medical History:   Diagnosis Date    Aneurysm 2024    Arthritis     Hashimoto's disease     Hyperlipidemia     Hypertension      Past Surgical History:  Past Surgical History:   Procedure Laterality Date    APPENDECTOMY      COLONOSCOPY      FOOT SURGERY " Bilateral     INTERVENTIONAL RADIOLOGY PROCEDURE Bilateral 09/17/2024    Procedure: Carotid Cerebral Angiogram;  Surgeon: Hilton Menendez MD;  Location: [facility] INVAS[facility] LOC[facility];  Service: Interventional Radiology;  Laterality: Bilateral;    LAPAROSCOPIC TOTAL HYSTERECTOMY      STOMACH SURGERY      UVULECTOMY       Social Hx:  Social History     Tobacco Use    Smoking status: Every Day     Current packs/day: 0.25     Types: Cigarettes     Passive exposure: Current    Smokeless tobacco: Never    Tobacco comments:     4-5 CIGS A DAY AS OF 10/09/2024 PER PATIENT    Vaping Use    Vaping status: Never Used   Substance Use Topics    Alcohol use: Not Currently    Drug use: Never     Family Hx:  History reviewed. No pertinent family history.      Physical Exam:   There were no vitals taken for this visit.    Awake, alert and oriented x 3  Opens eyes spont  Pupils 3 mm rx bilat  Extraocular muscles intact bilaterally  Face symmetric bilaterally  Tongue midline  5/5 in all 4 ext    Intake/Output: No intake or output data in the 24 hours ending 10/15/24 1025    Current Medications:   Current Facility-Administered Medications:     sodium chloride 0.9 % flush 1-10 mL, 1-10 mL, Intravenous, PRN, Hilton Menendez MD    sodium chloride 0.9 % flush 10 mL, 10 mL, Intravenous, Q12H, Hilton Menendez MD    sodium chloride 0.9 % infusion 40 mL, 40 mL, Intravenous, PRN, Hilton Menendez MD    sodium chloride 0.9 % infusion, 100 mL/hr, Intravenous, Continuous, Hilton Menendez MD, Last Rate: 100 mL/hr at 10/15/24 1022, 100 mL/hr at 10/15/24 1022     Laboratory Results:      Lab 10/09/24  1508   WBC 5.98   HEMOGLOBIN 14.4   HEMATOCRIT 42.7   PLATELETS 267   MCV 93.6   PROTIME 12.4         Lab 10/09/24  1508   SODIUM 141   POTASSIUM 4.1   CHLORIDE 105   CO2 23.0   ANION GAP 13.0   BUN 12   CREATININE 0.80   EGFR 86.1   GLUCOSE 95   CALCIUM 9.7   HEMOGLOBIN A1C 5.10             Lab 10/09/24  1508   PROTIME 12.4   INR 0.92                  Brief Urine Lab Results       None          Microbiology Results (last 10 days)       ** No results found for the last 240 hours. **             Diagnostic Imaging: The patient's diagnostic imaging was independently reviewed and interpreted by myself.    Assessment/Plan:    1. Cerebral aneurysm, nonruptured         This is a 59-year-old female here for embolization of a cerebral aneurysm.      Hilton Menendez MD  10/15/24  10:25 EDT      Decx

## 2024-10-15 NOTE — ANESTHESIA PREPROCEDURE EVALUATION
Anesthesia Evaluation                  Airway   Mallampati: I  TM distance: >3 FB  Neck ROM: full  No difficulty expected  Dental      Pulmonary    Cardiovascular     ECG reviewed    (+) hypertension      Neuro/Psych  GI/Hepatic/Renal/Endo    (+) thyroid problem     Musculoskeletal     Abdominal    Substance History      OB/GYN          Other   arthritis,                 Anesthesia Plan    ASA 3     general     intravenous induction     Anesthetic plan, risks, benefits, and alternatives have been provided, discussed and informed consent has been obtained with: patient.    Plan discussed with CRNA.    CODE STATUS:

## 2024-10-15 NOTE — BRIEF OP NOTE
CV IR INTRACRANIAL EMBOLIZATION  Progress Note    YanethSheila GutierrezReyes  10/15/2024    Pre-op Diagnosis:   Cerebral aneurysm, nonruptured [I67.1]       Post-Op Diagnosis Codes:     * Cerebral aneurysm, nonruptured [I67.1]    Procedure/CPT® Codes:        Procedure(s):  Embolization              Surgeon(s):  Hilton Menendez MD    Anesthesia: General    Staff:   Scrub Person: Yomi Peck  Documenter: Ruma Mcgrath RN  Invasive Nurse: Vanesa Shepherd RN         Estimated Blood Loss: minimal    Urine Voided: * No values recorded between 10/15/2024 11:14 AM and 10/15/2024 12:38 PM *    Specimens:                None          Drains: * No LDAs found *    Findings: Successful flow diversion treatment of an unruptured right ICA aneurysm        Complications: None apparent          Hilton Menendez MD     Date: 10/15/2024  Time: 12:41 EDT

## 2024-10-15 NOTE — ANESTHESIA PROCEDURE NOTES
Airway  Urgency: elective    Date/Time: 10/15/2024 11:37 AM  Airway not difficult    General Information and Staff    Patient location during procedure: OR  CRNA/CAA: Junior ROMAN Garcia, CRNA    Indications and Patient Condition  Indications for airway management: airway protection    Preoxygenated: yes  MILS not maintained throughout  Mask difficulty assessment: 1 - vent by mask    Final Airway Details  Final airway type: endotracheal airway      Successful airway: ETT  Cuffed: yes   Successful intubation technique: direct laryngoscopy  Facilitating devices/methods: intubating stylet and anterior pressure/BURP  Endotracheal tube insertion site: oral  Blade: Chuck  Blade size: 3  ETT size (mm): 7.0  Cormack-Lehane Classification: grade I - full view of glottis  Placement verified by: chest auscultation and capnometry   Measured from: lips  ETT/EBT  to lips (cm): 20  Number of attempts at approach: 1  Assessment: lips, teeth, and gum same as pre-op and atraumatic intubation    Additional Comments  Negative epigastric sounds, Breath sound equal bilaterally with symmetric chest rise and fall

## 2024-10-15 NOTE — ANESTHESIA POSTPROCEDURE EVALUATION
Patient: Johanne Reyes    Procedure Summary       Date: 10/15/24 Room / Location: [facility] /  [facility]    Anesthesia Start: 1117 Anesthesia Stop: 1253    Procedure: Embolization Diagnosis:       Cerebral aneurysm, nonruptured      (Cerebral aneurysm, nonruptured [I67.1])    Providers: Hilton Menendez MD Provider: Maico Betts MD    Anesthesia Type: general ASA Status: 3            Anesthesia Type: general    Vitals  No vitals data found for the desired time range.          Post Anesthesia Care and Evaluation    Patient location during evaluation: PACU  Patient participation: complete - patient participated  Level of consciousness: awake and alert  Pain management: adequate    Airway patency: patent  Anesthetic complications: No anesthetic complications  PONV Status: none  Cardiovascular status: hemodynamically stable and acceptable  Respiratory status: nonlabored ventilation, acceptable and nasal cannula  Hydration status: acceptable    Comments: 128/70 97.3 rr15 hr 79 96%

## 2024-10-15 NOTE — Clinical Note
A 8 fr sheath was successfully inserted using micropuncture technique into the right femoral artery. Sheath insertion not delayed.

## 2024-10-15 NOTE — Clinical Note
Detail Level: Zone Prepped: right groin and Right Wrist. Prepped with: ChloraPrep. The site was clipped. The patient was draped in a sterile fashion.

## 2024-10-16 VITALS
SYSTOLIC BLOOD PRESSURE: 109 MMHG | TEMPERATURE: 98.1 F | DIASTOLIC BLOOD PRESSURE: 76 MMHG | RESPIRATION RATE: 16 BRPM | HEART RATE: 63 BPM | OXYGEN SATURATION: 96 % | WEIGHT: 148.81 LBS | BODY MASS INDEX: 22.55 KG/M2 | HEIGHT: 68 IN

## 2024-10-16 LAB
DEPRECATED RDW RBC AUTO: 38.9 FL (ref 37–54)
ERYTHROCYTE [DISTWIDTH] IN BLOOD BY AUTOMATED COUNT: 11.7 % (ref 12.3–15.4)
HCT VFR BLD AUTO: 34 % (ref 34–46.6)
HGB BLD-MCNC: 12.3 G/DL (ref 12–15.9)
MCH RBC QN AUTO: 32.7 PG (ref 26.6–33)
MCHC RBC AUTO-ENTMCNC: 36.2 G/DL (ref 31.5–35.7)
MCV RBC AUTO: 90.4 FL (ref 79–97)
PLATELET # BLD AUTO: 224 10*3/MM3 (ref 140–450)
PMV BLD AUTO: 9.4 FL (ref 6–12)
RBC # BLD AUTO: 3.76 10*6/MM3 (ref 3.77–5.28)
WBC NRBC COR # BLD AUTO: 8.24 10*3/MM3 (ref 3.4–10.8)

## 2024-10-16 PROCEDURE — 99231 SBSQ HOSP IP/OBS SF/LOW 25: CPT | Performed by: INTERNAL MEDICINE

## 2024-10-16 PROCEDURE — 85027 COMPLETE CBC AUTOMATED: CPT | Performed by: STUDENT IN AN ORGANIZED HEALTH CARE EDUCATION/TRAINING PROGRAM

## 2024-10-16 PROCEDURE — 99231 SBSQ HOSP IP/OBS SF/LOW 25: CPT | Performed by: STUDENT IN AN ORGANIZED HEALTH CARE EDUCATION/TRAINING PROGRAM

## 2024-10-16 RX ORDER — ASPIRIN 81 MG/1
81 TABLET, CHEWABLE ORAL DAILY
Qty: 30 TABLET | Refills: 2 | Status: SHIPPED | OUTPATIENT
Start: 2024-10-17

## 2024-10-16 RX ORDER — CLOPIDOGREL BISULFATE 75 MG/1
75 TABLET ORAL DAILY
Qty: 60 TABLET | Refills: 1 | Status: SHIPPED | OUTPATIENT
Start: 2024-10-16

## 2024-10-16 RX ADMIN — OXYCODONE AND ACETAMINOPHEN 1 TABLET: 10; 325 TABLET ORAL at 08:46

## 2024-10-16 RX ADMIN — LEVOTHYROXINE SODIUM 25 MCG: 25 TABLET ORAL at 06:17

## 2024-10-16 RX ADMIN — PANTOPRAZOLE SODIUM 40 MG: 40 TABLET, DELAYED RELEASE ORAL at 08:46

## 2024-10-16 RX ADMIN — CETIRIZINE HYDROCHLORIDE 10 MG: 10 TABLET, FILM COATED ORAL at 08:46

## 2024-10-16 RX ADMIN — ATORVASTATIN CALCIUM 10 MG: 10 TABLET, FILM COATED ORAL at 08:46

## 2024-10-16 RX ADMIN — ESCITALOPRAM OXALATE 20 MG: 20 TABLET, FILM COATED ORAL at 09:01

## 2024-10-16 RX ADMIN — SENNOSIDES AND DOCUSATE SODIUM 2 TABLET: 50; 8.6 TABLET ORAL at 08:45

## 2024-10-16 RX ADMIN — ACETAMINOPHEN 650 MG: 325 TABLET ORAL at 06:17

## 2024-10-16 RX ADMIN — ASPIRIN 81 MG 81 MG: 81 TABLET ORAL at 08:46

## 2024-10-16 RX ADMIN — CLOPIDOGREL BISULFATE 75 MG: 75 TABLET ORAL at 08:46

## 2024-10-16 RX ADMIN — VENLAFAXINE HYDROCHLORIDE 37.5 MG: 37.5 CAPSULE, EXTENDED RELEASE ORAL at 08:46

## 2024-10-16 RX ADMIN — SUCRALFATE 1 G: 1 TABLET ORAL at 08:46

## 2024-10-16 NOTE — PROGRESS NOTES
INTENSIVIST   PROGRESS NOTE         SUBJECTIVE     Ms. Johanne Reyes, 59 y.o. female is followed for: No chief complaint on file.       S/P Procedure(s) (LRB):  Embolization (N/A)   Perioperative medical management of comorbid conditions, including glycemic control and electrolytes disorders.    As an Intensivist, we provide an integrated approach to the ICU patient and family, medical management of comorbid conditions, including but not limited to electrolytes, glycemic control, organ dysfunction, lead interdisciplinary rounds and coordinate the care with all other services, including those from other specialists.     Interval History:  POD: 1 Day Post-Op    Uneventful night.    Ready to go home.    Temp  Min: 96.6 °F (35.9 °C)  Max: 98.5 °F (36.9 °C)     The patient's relevant past medical, surgical and social history were reviewed and updated in Epic as appropriate.     History     Last Reviewed by Paula Brooks RN on 10/15/2024 at  6:37 PM    Sections Reviewed    Medical, Surgical, Family, Tobacco, Alcohol, Drug Use, Sexual Activity,   Social Documentation, Custom    Problem list reviewed by Fran Carrillo MD on 10/15/2024 at  5:06 PM  Medicines reviewed by Fran Carrillo MD on 10/15/2024 at  5:06 PM  Allergies reviewed by Fran Carrillo MD on 10/15/2024 at  5:06 PM           OBJECTIVE     Vitals:  Temp: 98.1 °F (36.7 °C) (10/16/24 0800) Temp  Min: 96.6 °F (35.9 °C)  Max: 98.5 °F (36.9 °C)   Temp core:      BP: 113/62 (10/16/24 1000) BP  Min: 80/42  Max: 128/70   MAP (non-invasive) Noninvasive MAP (mmHg): 81 (10/16/24 1000) Noninvasive MAP (mmHg)  Av.1  Min: 57  Max: 96   Pulse: 77 (10/16/24 1000) Pulse  Min: 55  Max: 97   Resp: 16 (10/16/24 1000) Resp  Min: 12  Max: 18   SpO2: 96 % (10/16/24 1000) SpO2  Min: 92 %  Max: 98 %   Device: room air (10/16/24 1000)    Flow Rate: 2 (10/15/24 1330) Flow (L/min) (Oxygen Therapy)  Min: 2  Max: 2         10/16/24  0321   Weight: 67.5 kg (148 lb 13 oz)         Intake/Ouptut 24 hrs (7:00AM - 6:59 AM)  Intake & Output (last 2 days)         10/14 0701  10/15 0700 10/15 0701  10/16 0700 10/16 0701  10/17 0700    P.O.  240 300    I.V. (mL/kg)  400 (5.9)     Total Intake(mL/kg)  640 (9.5) 300 (4.4)    Urine (mL/kg/hr)  900     Blood  5     Total Output  905     Net  -265 +300           Urine Unmeasured Occurrence  2 x             Medications (drips):  niCARdipine  sodium chloride, Last Rate: 100 mL/hr (10/15/24 1117)        Physical Examination  Telemetry:  Rhythm: normal sinus rhythm, sinus bradycardia (10/16/24 0800)      Constitutional:  No acute distress.   Cardiovascular: RRR.    Respiratory: Normal breath sounds  No adventitious sounds   Abdominal:  Soft with no tenderness.   Extremities: No Edema   Neurological:   Alert, Oriented, Cooperative.    Best Eye Response: 4-->(E4) spontaneous (10/16/24 1000)  Best Motor Response: 6-->(M6) obeys commands (10/16/24 1000)  Best Verbal Response: 5-->(V5) oriented (10/16/24 1000)  Santino Coma Scale Score: 15 (10/16/24 1000)               Hematology:  Results from last 7 days   Lab Units 10/16/24  0752 10/15/24  1729 10/09/24  1508   WBC 10*3/mm3 8.24  --  5.98   HEMOGLOBIN g/dL 12.3 12.9 14.4   MCV fL 90.4  --  93.6   PLATELETS 10*3/mm3 224  --  267         Chemistry:  Estimated Creatinine Clearance: 101.8 mL/min (by C-G formula based on SCr of 0.65 mg/dL).    Results from last 7 days   Lab Units 10/15/24  1729 10/09/24  1508   SODIUM mmol/L 139 141   POTASSIUM mmol/L 4.6 4.1   CHLORIDE mmol/L 109* 105   CO2 mmol/L 21.0* 23.0   BUN mg/dL 12 12   CREATININE mg/dL 0.65 0.80   GLUCOSE mg/dL 127* 95     Results from last 7 days   Lab Units 10/15/24  1729 10/09/24  1508   CALCIUM mg/dL 8.4* 9.7       Results: Reviewed.  I reviewed the patient's new laboratory and imaging results.  I independently reviewed the patient's new images.    Medications: Reviewed.      Assessment   A/P     Hospital:  LOS: 1 day   ICU: 19h     Active  Hospital Problems    Diagnosis  POA    **Cerebral aneurysm, nonruptured [I67.1]  Unknown    Cerebral aneurysm [I67.1]  Yes     Johanne is a 59 y.o. 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  Procedure(s) (LRB):  Embolization (N/A)   Surgeon Dr. Hilton Menendez, (Neurosurgery)   10/15/24     Pulmonary  Tobacco use 0.25 ppd x 30 years.  Cardiovascular  HTN  Dyslipidemia   Rheumatology  SLE  Rheumatoid Arthritis  GI/Hepatology  GERD  Endocrine   BMI 22.64 Normal: 18.5-24.9kg/m2  Hashimoto's disease. ? Treatment with levothyroxine 25 mcg/d  No history of Diabetes    Lab Results   Lab Value Date/Time    HGBA1C 5.10 10/09/2024 1508           Diet: Diet: Regular/House; Fluid Consistency: Thin (IDDSI 0)  No active supplement orders      Advance Directives: Code Status and Medical Interventions: CPR (Attempt to Resuscitate); Full Support   Ordered at: 10/15/24 1556     Code Status (Patient has no pulse and is not breathing):    CPR (Attempt to Resuscitate)     Medical Interventions (Patient has pulse or is breathing):    Full Support        VTE Prophylaxis:  Mechanical VTE prophylaxis orders are present.          In brief:     Disposition: Discharge Home as per Neurosurgery  DAPT: Clopidogrel + ASA    Plan of care and goals reviewed during interdisciplinary rounds.  I discussed the patient's findings and my recommendations with patient and nursing staff      [] Primary Attending Intensive Care Medicine - Nutrition Support   [x] Consultant    Copied text in this note has been reviewed and is accurate as of 10/16/24

## 2024-10-16 NOTE — DISCHARGE SUMMARY
[facility]    Date of Admission: 10/15/2024  Date of Discharge: 10/16/2024    Discharge Diagnosis:   Cerebral aneurysm, nonruptured [I67.1]  Cerebral aneurysm [I67.1]     Procedures Performed:  Procedure(s):  Embolization       Presenting Problem/History of Present Illness  Cerebral aneurysm, nonruptured [I67.1]  Cerebral aneurysm [I67.1]     Hospital Course:  Patient is a 59 y.o. female presented to the neurosurgery office presented to the office for evaluation of incidental cerebral aneurysm.  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  Formal angiogram showed 6 mm in maximal diameter.  Given the patient's young age and location as well as the size, Dr. Menendez recommended endovascular treatment.  She underwent successful flow diversion treatment of the unruptured right ICA aneurysm on 10/15/2024.  Postoperatively, she was monitored in the ICU overnight  and remained neurologically stable.  Groin soft.  Vital signs stable.  She will follow-up in the neurosurgery office in 4 weeks.  She will continue taking aspirin and Plavix daily.  She discharges home today in satisfactory condition.    Condition on Discharge: Satisfactory    Discharge Disposition:  Home or Self Care    Discharge Medications     Discharge Medications        New Medications        Instructions Start Date   aspirin 81 MG chewable tablet   81 mg, Oral, Daily   Start Date: October 17, 2024            Continue These Medications        Instructions Start Date   albuterol sulfate  (90 Base) MCG/ACT inhaler  Commonly known as: PROVENTIL HFA;VENTOLIN HFA;PROAIR HFA   INHALE 1 TO 2 PUFFS BY MOUTH FOUR TIMES DAILY AS NEEDED FOR SHORTNESS OF BREATH      albuterol (2.5 MG/3ML) 0.083% nebulizer solution  Commonly known as: PROVENTIL   INHALE THE CONTENTS OF 1 VIAL BY NEBULIZATION ROUTE EVERY 4-6 HOURS AS NEEDED FOR WHEEZING.      Clindesse 2 % vaginal  cream  Generic drug: Clindamycin Phosphate (1 Dose)   INSERT 1 APPLICATORFUL VAGINALLY ONCE FOR 1 DAY      clopidogrel 75 MG tablet  Commonly known as: Plavix   75 mg, Oral, Daily      cyclobenzaprine 10 MG tablet  Commonly known as: FLEXERIL   10 mg, As Needed      diazePAM 5 MG tablet  Commonly known as: VALIUM   5 mg, Every Morning      escitalopram 20 MG tablet  Commonly known as: LEXAPRO   20 mg, Daily      estradiol 0.1 MG/GM vaginal cream  Commonly known as: ESTRACE   insert 2-3 x a week      fexofenadine 180 MG tablet  Commonly known as: ALLEGRA   Take 1 tablet by mouth Daily.      levothyroxine 25 MCG tablet  Commonly known as: SYNTHROID, LEVOTHROID   Take 1 tablet by mouth Every Morning.      lovastatin 20 MG tablet  Commonly known as: MEVACOR   20 mg, Daily      metoprolol tartrate 25 MG tablet  Commonly known as: LOPRESSOR   Take 1 tablet by mouth As Needed. For HR > 100      mirtazapine 15 MG tablet  Commonly known as: REMERON   15 mg, Nightly      mupirocin 2 % ointment  Commonly known as: BACTROBAN   Apply 1 Application topically to the appropriate area as directed As Needed.      naloxone 4 MG/0.1ML nasal spray  Commonly known as: NARCAN   Administer 1 spray into the nostril(s) as directed by provider As Needed for Opioid Reversal.      nitroglycerin 0.4 MG SL tablet  Commonly known as: NITROSTAT   DISSOLVE ONE (1) TABLET(S) BY MOUTH UNDER TONGUE EVERY 5 MINUTES AS NEEDED FOR CHEST PAIN; IF PAIN PERSISTS AFTER 3 TABS IN 15 MIN, CALL 91      oxyCODONE-acetaminophen  MG per tablet  Commonly known as: PERCOCET   Take 1 tablet by mouth 3 (Three) Times a Day.      pantoprazole 40 MG EC tablet  Commonly known as: PROTONIX   40 mg, Daily      predniSONE 10 MG tablet  Commonly known as: DELTASONE   10 mg, Oral, As Needed      Premarin 0.625 MG/GM vaginal cream  Generic drug: Estrogens Conjugated       promethazine 12.5 MG tablet  Commonly known as: PHENERGAN   12.5 mg, As Needed      sucralfate 1 g  tablet  Commonly known as: CARAFATE   Take 1 tablet by mouth 4 (Four) Times a Day. 1 hour before meals      traZODone 150 MG tablet  Commonly known as: DESYREL   150 mg, Nightly      venlafaxine XR 37.5 MG 24 hr capsule  Commonly known as: EFFEXOR-XR   Take 1 capsule by mouth Daily.               Patient Education:  Continue taking aspirin and Plavix daily    Follow-up Appointments:   Follow-up Information       Hilton Menendez MD Follow up in 4 week(s).    Specialty: Neurosurgery  Contact information:  [facility] [address] [address] [phone]                               Stephanie Granados PA-C  10/16/24  09:37 EDT

## 2024-10-16 NOTE — CASE MANAGEMENT/SOCIAL WORK
Discharge Planning Assessment  [facility]     Patient Name: Johanne Reyes  MRN: [id number]  Today's Date: 10/16/2024    Admit Date: 10/15/2024    Plan: Home   Discharge Needs Assessment       Row Name 10/16/24 1230       Living Environment    People in Home significant other    Name(s) of People in Home Lives with significant other, Toribio Black, in [facility]    Current Living Arrangements home    Potentially Unsafe Housing Conditions none    In the past 12 months has the electric, gas, oil, or water company threatened to shut off services in your home? No    Primary Care Provided by self    Provides Primary Care For no one    Caregiving Concerns Independent with ADL's    Family Caregiver if Needed significant other    Family Caregiver Names Significant other, Toribio Black @ [phone]    Quality of Family Relationships helpful;involved;supportive    Able to Return to Prior Arrangements yes    Living Arrangement Comments Resides in private residence with SO       Resource/Environmental Concerns    Resource/Environmental Concerns none    Transportation Concerns none       Transportation Needs    In the past 12 months, has lack of transportation kept you from medical appointments or from getting medications? no    In the past 12 months, has lack of transportation kept you from meetings, work, or from getting things needed for daily living? No       Food Insecurity    Within the past 12 months, you worried that your food would run out before you got the money to buy more. Never true    Within the past 12 months, the food you bought just didn't last and you didn't have money to get more. Never true       Transition Planning    Patient/Family Anticipates Transition to home    Patient/Family Anticipated Services at Transition none    Transportation Anticipated family or friend will provide       Discharge Needs Assessment    Readmission Within the Last 30 Days no previous admission in last 30 days     Equipment Currently Used at Home none    Concerns to be Addressed no discharge needs identified;denies needs/concerns at this time    Do you want help finding or keeping work or a job? I do not need or want help    Do you want help with school or training? For example, starting or completing job training or getting a high school diploma, GED or equivalent No    Anticipated Changes Related to Illness none    Equipment Needed After Discharge none    Discharge Coordination/Progress Anticipate patient will return home at hospital discharge                   Discharge Plan       Row Name 10/16/24 1251       Plan    Plan Home    Plan Comments I met with patient at bedside-up walking in room.  Planning discharge home today, SO will be transporting.  No needs/concerns voiced at this time, reports she is independent with all ADL's.  Discussed in unit multidisciplinary rounds this morning.  Cira Alvarado, Ext. [phone]    Final Discharge Disposition Code 01 - home or self-care                  Continued Care and Services - Admitted Since 10/15/2024    No active coordination exists for this encounter.       Expected Discharge Date and Time       Expected Discharge Date Expected Discharge Time    Oct 16, 2024            Demographic Summary       Row Name 10/16/24 1230       General Information    Admission Type inpatient    Arrived From PACU/recovery room    Referral Source admission list;interdisciplinary rounds    Reason for Consult discharge planning    Preferred Language English                   Functional Status    No documentation.                  Psychosocial    No documentation.                  Abuse/Neglect    No documentation.                  Legal    No documentation.                  Substance Abuse    No documentation.                  Patient Forms    No documentation.                     SAULO Delacruz

## 2024-10-16 NOTE — PLAN OF CARE
Goal Outcome Evaluation:      No acute events overnight. Patient remains neurologically intact. Systolic BP . HR 55-70, SB to NSR. RA. Ambulated in room with standby assist. Right groin site CDI, soft, minimal bruising noted. Stool softeners requested per patient.

## 2024-10-16 NOTE — PROGRESS NOTES
"NEUROSURGERY PROGRESS NOTE    Chief Complaint: Cerebral aneurysm    Subjective: Stable overnight.  No issues.    Objective    Vital Signs: Blood pressure 120/56, pulse 80, temperature 98.1 °F (36.7 °C), temperature source Oral, resp. rate 16, height 172.7 cm (68\"), weight 67.5 kg (148 lb 13 oz), SpO2 96%.    Physical Exam  Awake, alert and oriented x 3  Opens eyes spont  Pupils 3 mm reactive bilaterally  Extraocular muscles intact bilaterally  Face symmetric bilaterally  Tongue midline  5/5 in all 4 extremities  Groin soft    Intake/Output:   Intake/Output Summary (Last 24 hours) at 10/16/2024 0929  Last data filed at 10/15/2024 2100  Gross per 24 hour   Intake 640 ml   Output 905 ml   Net -265 ml       Current Medications:   Current Facility-Administered Medications:     acetaminophen (TYLENOL) tablet 650 mg, 650 mg, Oral, Q4H PRN, Hilton Menendez MD, 650 mg at 10/16/24 0617    aspirin chewable tablet 81 mg, 81 mg, Oral, Daily, Hilton Menendez MD, 81 mg at 10/16/24 0846    atorvastatin (LIPITOR) tablet 10 mg, 10 mg, Oral, Daily, Hilton Menendez MD, 10 mg at 10/16/24 0846    cetirizine (zyrTEC) tablet 10 mg, 10 mg, Oral, Daily, Hilton Menendez MD, 10 mg at 10/16/24 0846    clopidogrel (PLAVIX) tablet 75 mg, 75 mg, Oral, Daily, Hilton Menendez MD, 75 mg at 10/16/24 0846    diazePAM (VALIUM) tablet 5 mg, 5 mg, Oral, Nightly, Hilton Menendez MD, 5 mg at 10/15/24 2239    escitalopram (LEXAPRO) tablet 20 mg, 20 mg, Oral, Daily, Hilton Menendez MD, 20 mg at 10/16/24 0901    levothyroxine (SYNTHROID, LEVOTHROID) tablet 25 mcg, 25 mcg, Oral, QAM, Hilton Menendez MD, 25 mcg at 10/16/24 0617    mirtazapine (REMERON) tablet 15 mg, 15 mg, Oral, Nightly, Hilton Menendez MD, 15 mg at 10/15/24 2101    niCARdipine (CARDENE) 25mg in 250mL NS infusion, 5-15 mg/hr, Intravenous, Titrated, Hilton Menendez MD    nitroglycerin (NITROSTAT) SL tablet 0.4 mg, 0.4 mg, Sublingual, Q5 Min PRN, Hilton Menendez, " MD    ondansetron (ZOFRAN) injection 4 mg, 4 mg, Intravenous, Q6H PRN, Hilton Menendez MD    oxyCODONE-acetaminophen (PERCOCET)  MG per tablet 1 tablet, 1 tablet, Oral, TID, Hilton Menendez MD, 1 tablet at 10/16/24 0846    pantoprazole (PROTONIX) EC tablet 40 mg, 40 mg, Oral, Daily, Hilton Menendez MD, 40 mg at 10/16/24 0846    sennosides-docusate (PERICOLACE) 8.6-50 MG per tablet 2 tablet, 2 tablet, Oral, BID, Jackelyn Horn PA-C, 2 tablet at 10/16/24 0845    sodium chloride 0.9 % infusion, 100 mL/hr, Intravenous, Continuous, Hliton Menendez MD, Last Rate: 100 mL/hr at 10/15/24 1117, New Bag at 10/15/24 1243    sucralfate (CARAFATE) tablet 1 g, 1 g, Oral, 4x Daily, Hilton Menendez MD, 1 g at 10/16/24 0846    traZODone (DESYREL) tablet 150 mg, 150 mg, Oral, Nightly, Hilton Menendez MD, 150 mg at 10/15/24 2101    venlafaxine XR (EFFEXOR-XR) 24 hr capsule 37.5 mg, 37.5 mg, Oral, Daily, Hilton Menendez MD, 37.5 mg at 10/16/24 0846     Laboratory Results:       Lab 10/16/24  0752 10/15/24  1729 10/09/24  1508   WBC 8.24  --  5.98   HEMOGLOBIN 12.3 12.9 14.4   HEMATOCRIT 34.0 37.1 42.7   PLATELETS 224  --  267   MCV 90.4  --  93.6   PROTIME  --   --  12.4         Lab 10/15/24  1729 10/09/24  1508   SODIUM 139 141   POTASSIUM 4.6 4.1   CHLORIDE 109* 105   CO2 21.0* 23.0   ANION GAP 9.0 13.0   BUN 12 12   CREATININE 0.65 0.80   EGFR 102.8 86.1   GLUCOSE 127* 95   CALCIUM 8.4* 9.7   HEMOGLOBIN A1C  --  5.10             Lab 10/09/24  1508   PROTIME 12.4   INR 0.92                 Brief Urine Lab Results       None          Microbiology Results (last 10 days)       ** No results found for the last 240 hours. **             Diagnostic Imaging: I reviewed and independently interpreted the new imaging.     Assessment/Plan:    1. Cerebral aneurysm, nonruptured         This is a 59-year-old female status post flow diversion treatment of an unruptured right ICA aneurysm on 10/15.  Patient has done  well overnight and is neurologically stable.  We will plan to discharge home today.  She should remain on aspirin and Plavix daily.  We will see her back in the office in 4 weeks.      Any copied data from previous notes included in the (1) History of Present Illness, (2) Physical Examination and (3) Medical Decision Making and/or Assessment and Plan has been reviewed and is accurate as of 10/16/24      Hilton Menendez MD  10/16/24  09:29 EDT

## 2024-11-15 ENCOUNTER — OFFICE VISIT (OUTPATIENT)
Dept: NEUROSURGERY | Facility: CLINIC | Age: 59
End: 2024-11-15
Payer: MEDICAID

## 2024-11-15 VITALS
OXYGEN SATURATION: 100 % | BODY MASS INDEX: 22.07 KG/M2 | HEIGHT: 68 IN | DIASTOLIC BLOOD PRESSURE: 84 MMHG | SYSTOLIC BLOOD PRESSURE: 122 MMHG | HEART RATE: 57 BPM | WEIGHT: 145.6 LBS | TEMPERATURE: 97.1 F

## 2024-11-15 DIAGNOSIS — I67.1 CEREBRAL ANEURYSM, NONRUPTURED: Primary | ICD-10-CM

## 2024-11-15 PROCEDURE — 99024 POSTOP FOLLOW-UP VISIT: CPT

## 2024-11-15 RX ORDER — BACLOFEN 10 MG/1
10 TABLET ORAL 3 TIMES DAILY
COMMUNITY

## 2024-11-15 NOTE — PROGRESS NOTES
"  Name: Johanne Reyes    : 1965     MRN: [id number]     Primary Care Provider: Serge Fu MD    Chief Complaint  Cerebral Aneurysm      History of Present Illness:  Johanne Reyes is a 59 y.o. female who is 4 weeks status post flow diversion treatment of an unruptured right ICA aneurysm.  Patient has remained neurologically intact and denies any new focal deficit.  She is been taking her aspirin and Plavix appropriately.  Today, she is concerned for a racing heart rate.  She was previously seen in the ER for right hip pain.  Denies any increasing pain, swelling or bruising of her right groin site.    PMHX  Allergies:  Allergies   Allergen Reactions    Sulfa Antibiotics Hives    Wound Dressing Adhesive Rash    Adhesive Tape Itching    Amoxicillin-Pot Clavulanate Other (See Comments) and Nausea Only    Methotrexate Derivatives Other (See Comments)     \"Makes my pancreas growl\"    Metronidazole Nausea Only    Pregabalin Other (See Comments) and Confusion    Sulfamethoxazole-Trimethoprim Hives and Other (See Comments)    Terbinafine Unknown - High Severity     Nostril bleeding    Trimethoprim Other (See Comments)    Vancomycin Confusion and Other (See Comments)    5ht3 Receptor Antagonists Anxiety    Amitriptyline Anxiety    Buprenorphine-Naloxone Anxiety and Other (See Comments)    Butorphanol Anxiety and Other (See Comments)    Cefdinir Anxiety, Nausea Only and Other (See Comments)    Gabapentin Anxiety and Other (See Comments)     Medications    Current Outpatient Medications:     albuterol (PROVENTIL) (2.5 MG/3ML) 0.083% nebulizer solution, INHALE THE CONTENTS OF 1 VIAL BY NEBULIZATION ROUTE EVERY 4-6 HOURS AS NEEDED FOR WHEEZING., Disp: , Rfl:     albuterol sulfate  (90 Base) MCG/ACT inhaler, INHALE 1 TO 2 PUFFS BY MOUTH FOUR TIMES DAILY AS NEEDED FOR SHORTNESS OF BREATH, Disp: , Rfl:     aspirin 81 MG chewable tablet, Chew 1 tablet Daily., Disp: 30 tablet, Rfl: 2    baclofen (LIORESAL) " 10 MG tablet, Take 1 tablet by mouth 3 (Three) Times a Day., Disp: , Rfl:     Clindamycin Phosphate, 1 Dose, (Clindesse) 2 % vaginal cream, INSERT 1 APPLICATORFUL VAGINALLY ONCE FOR 1 DAY, Disp: , Rfl:     clopidogrel (Plavix) 75 MG tablet, Take 1 tablet by mouth Daily., Disp: 60 tablet, Rfl: 1    cyclobenzaprine (FLEXERIL) 10 MG tablet, Take 1 tablet by mouth As Needed for Muscle Spasms., Disp: , Rfl:     diazePAM (VALIUM) 5 MG tablet, Take 1 tablet by mouth Every Morning., Disp: , Rfl:     escitalopram (LEXAPRO) 20 MG tablet, Take 1 tablet by mouth Daily., Disp: , Rfl:     estradiol (ESTRACE) 0.1 MG/GM vaginal cream, insert 2-3 x a week, Disp: , Rfl:     Estrogens Conjugated (Premarin) 0.625 MG/GM vaginal cream, , Disp: , Rfl:     fexofenadine (ALLEGRA) 180 MG tablet, Take 1 tablet by mouth Daily., Disp: , Rfl:     levothyroxine (SYNTHROID, LEVOTHROID) 25 MCG tablet, Take 1 tablet by mouth Every Morning., Disp: , Rfl:     lovastatin (MEVACOR) 20 MG tablet, Take 1 tablet by mouth Daily., Disp: , Rfl:     metoprolol tartrate (LOPRESSOR) 25 MG tablet, Take 1 tablet by mouth As Needed. For HR > 100, Disp: , Rfl:     mirtazapine (REMERON) 15 MG tablet, Take 1 tablet by mouth Every Night., Disp: , Rfl:     naloxone (NARCAN) 4 MG/0.1ML nasal spray, Administer 1 spray into the nostril(s) as directed by provider As Needed for Opioid Reversal., Disp: , Rfl:     nitroglycerin (NITROSTAT) 0.4 MG SL tablet, DISSOLVE ONE (1) TABLET(S) BY MOUTH UNDER TONGUE EVERY 5 MINUTES AS NEEDED FOR CHEST PAIN; IF PAIN PERSISTS AFTER 3 TABS IN 15 MIN, CALL 91, Disp: , Rfl:     oxyCODONE-acetaminophen (PERCOCET)  MG per tablet, Take 1 tablet by mouth 3 (Three) Times a Day., Disp: , Rfl:     pantoprazole (PROTONIX) 40 MG EC tablet, Take 1 tablet by mouth Daily., Disp: , Rfl:     predniSONE (DELTASONE) 10 MG tablet, Take 1 tablet by mouth As Needed., Disp: , Rfl:     promethazine (PHENERGAN) 12.5 MG tablet, Take 1 tablet by mouth As  Needed for Nausea., Disp: , Rfl:     sucralfate (CARAFATE) 1 g tablet, Take 1 tablet by mouth 4 (Four) Times a Day. 1 hour before meals, Disp: , Rfl:     traZODone (DESYREL) 150 MG tablet, Take 1 tablet by mouth Every Night., Disp: , Rfl:     venlafaxine XR (EFFEXOR-XR) 37.5 MG 24 hr capsule, Take 1 capsule by mouth Daily., Disp: , Rfl:   Past Medical History:  Past Medical History:   Diagnosis Date    A-fib     Aneurysm 07/2024    Arthritis     Hashimoto's disease     Hyperlipidemia     Hypertension      Past Surgical History:  Past Surgical History:   Procedure Laterality Date    APPENDECTOMY      COLONOSCOPY      FOOT SURGERY Bilateral     INTERVENTIONAL RADIOLOGY PROCEDURE Bilateral 09/17/2024    Procedure: Carotid Cerebral Angiogram;  Surgeon: Hilton Menendez MD;  Location:  [facility];  Service: Interventional Radiology;  Laterality: Bilateral;    INTERVENTIONAL RADIOLOGY PROCEDURE N/A 10/15/2024    Procedure: Embolization;  Surgeon: Hilton Menendez MD;  Location:  [facility];  Service: Interventional Radiology;  Laterality: N/A;    LAPAROSCOPIC TOTAL HYSTERECTOMY      STOMACH SURGERY      UVULECTOMY       Social Hx:  Social History     Tobacco Use    Smoking status: Every Day     Current packs/day: 0.25     Types: Cigarettes     Passive exposure: Current    Smokeless tobacco: Never    Tobacco comments:     4-5 CIGS A DAY AS OF 10/09/2024 PER PATIENT    Vaping Use    Vaping status: Never Used   Substance Use Topics    Alcohol use: Not Currently    Drug use: Never     Family Hx:  History reviewed. No pertinent family history.  Review of Systems:        Review of Systems   Constitutional:  Negative for activity change, appetite change, chills, diaphoresis, fatigue, fever and unexpected weight change.   HENT:  Negative for congestion, dental problem, drooling, ear discharge, ear pain, facial swelling, hearing loss, mouth sores, nosebleeds, postnasal drip, rhinorrhea,  "sinus pressure, sinus pain, sneezing, sore throat, tinnitus, trouble swallowing and voice change.    Eyes:  Negative for photophobia, pain, discharge, redness, itching and visual disturbance.   Respiratory:  Negative for apnea, cough, choking, chest tightness, shortness of breath, wheezing and stridor.    Cardiovascular:  Negative for chest pain, palpitations and leg swelling.   Gastrointestinal:  Negative for abdominal distention, abdominal pain, anal bleeding, blood in stool, constipation, diarrhea, nausea, rectal pain and vomiting.   Endocrine: Negative for cold intolerance, heat intolerance, polydipsia, polyphagia and polyuria.   Genitourinary:  Negative for decreased urine volume, difficulty urinating, dyspareunia, dysuria, enuresis, flank pain, frequency, genital sores, hematuria, menstrual problem, pelvic pain, urgency, vaginal bleeding, vaginal discharge and vaginal pain.   Musculoskeletal:  Negative for arthralgias, back pain, gait problem, joint swelling, myalgias, neck pain and neck stiffness.   Skin:  Negative for color change, pallor, rash and wound.   Allergic/Immunologic: Negative for environmental allergies, food allergies and immunocompromised state.   Neurological:  Negative for dizziness, tremors, seizures, syncope, facial asymmetry, speech difficulty, weakness, light-headedness, numbness and headaches.   Hematological:  Negative for adenopathy. Does not bruise/bleed easily.   Psychiatric/Behavioral:  Negative for agitation, behavioral problems, confusion, decreased concentration, dysphoric mood, hallucinations, self-injury, sleep disturbance and suicidal ideas. The patient is not nervous/anxious and is not hyperactive.           Vital Signs: /84 (BP Location: Left arm, Patient Position: Sitting, Cuff Size: Adult)   Pulse 57   Temp 97.1 °F (36.2 °C) (Infrared)   Ht 172.7 cm (67.99\")   Wt 66 kg (145 lb 9.6 oz)   SpO2 100%   BMI 22.14 kg/m²      Physical Exam  Awake, alert and oriented x " 3  Speech f/c  Opens eyes spontaneously  Pupils 3 mm rx bilaterally  Extraocular muscles intact bilaterally  Face symmetric bilaterally  Tongue midline  5/5 in all 4 extremities  Groin soft, small amount of bruising.      Social History    Tobacco Use      Smoking status: Every Day        Packs/day: 0.25        Types: Cigarettes        Passive exposure: Current      Smokeless tobacco: Never      Tobacco comments: 4-5 CIGS A DAY AS OF 10/09/2024 PER PATIENT        Tobacco Use: High Risk (11/15/2024)    Patient History     Smoking Tobacco Use: Every Day     Smokeless Tobacco Use: Never     Passive Exposure: Current           STEADI Fall Risk Assessment has not been completed.      Diagnostic Studies: (All imaging is independently reviewed unless stated otherwise.)        Assessment/Plan    Diagnoses and all orders for this visit:    1. Cerebral aneurysm, nonruptured (Primary)         This is a 59-year-old female who 4 weeks status post flow diversion treatment of an unruptured right ICA aneurysm with Dr. Menendez.  Patient has remained neurologically stable on exam and denies any new neurological complaints.  She has been taking her aspirin and Plavix appropriately and will continue to do so.  She will call the office  when she needs refills.  She will follow-up in the neurosurgery office in 4 months to discuss follow-up angiogram.  Recommended she follow-up with her primary care in regards to her heart rate. Patient encouraged to contact us if she has any changes in her condition or any concerns.    Any copied data from previous notes included in the (1) HPI, (2) PE, (3) MDM and/or Assessment and Plan has been reviewed and accurate as of 11/15/24.    Stephanie Granados PA-C  11/15/24

## 2025-03-13 ENCOUNTER — OFFICE VISIT (OUTPATIENT)
Dept: NEUROSURGERY | Facility: CLINIC | Age: 60
End: 2025-03-13
Payer: MEDICAID

## 2025-03-13 VITALS — TEMPERATURE: 97.1 F | WEIGHT: 145 LBS | HEIGHT: 67 IN | BODY MASS INDEX: 22.76 KG/M2

## 2025-03-13 DIAGNOSIS — I67.1 CEREBRAL ANEURYSM, NONRUPTURED: Primary | ICD-10-CM

## 2025-03-13 NOTE — PROGRESS NOTES
"NEUROSURGERY PROGRESS NOTE    Patient: Johanne Reyes  : 1965    Primary Care Provider: Serge Fu MD    Chief Complaint: Unruptured cerebral aneurysm    Subjective: This is a 59-year-old female who underwent flow diversion treatment of an unruptured right internal carotid artery aneurysm approximately 5 months ago.  She is here today for follow-up.  She has been having some vague issues with headaches, but denies any focal neurological deficits or symptoms concerning for stroke or TIA-like event.  She has been taking her aspirin and Plavix appropriately.    Objective    Vital Signs: Temperature 97.1 °F (36.2 °C), temperature source Infrared, height 170.2 cm (67\"), weight 65.8 kg (145 lb).    Physical Exam  Awake, alert and oriented x 3  Opens eyes spont  Pupils 3 mm rx bilat  Extraocular muscles intact bilaterally  Face symmetric bilaterally  Tongue midline  5/5 in all 4 ext  No pronator drift    Current Medications:   Current Outpatient Medications:     albuterol (PROVENTIL) (2.5 MG/3ML) 0.083% nebulizer solution, INHALE THE CONTENTS OF 1 VIAL BY NEBULIZATION ROUTE EVERY 4-6 HOURS AS NEEDED FOR WHEEZING., Disp: , Rfl:     albuterol sulfate  (90 Base) MCG/ACT inhaler, INHALE 1 TO 2 PUFFS BY MOUTH FOUR TIMES DAILY AS NEEDED FOR SHORTNESS OF BREATH, Disp: , Rfl:     aspirin 81 MG chewable tablet, Chew 1 tablet Daily., Disp: 30 tablet, Rfl: 2    baclofen (LIORESAL) 10 MG tablet, Take 1 tablet by mouth 3 (Three) Times a Day., Disp: , Rfl:     Clindamycin Phosphate, 1 Dose, (Clindesse) 2 % vaginal cream, INSERT 1 APPLICATORFUL VAGINALLY ONCE FOR 1 DAY, Disp: , Rfl:     clopidogrel (Plavix) 75 MG tablet, Take 1 tablet by mouth Daily., Disp: 60 tablet, Rfl: 1    cyclobenzaprine (FLEXERIL) 10 MG tablet, Take 1 tablet by mouth As Needed for Muscle Spasms., Disp: , Rfl:     diazePAM (VALIUM) 5 MG tablet, Take 1 tablet by mouth Every Morning., Disp: , Rfl:     escitalopram (LEXAPRO) 20 MG tablet, " Take 1 tablet by mouth Daily., Disp: , Rfl:     estradiol (ESTRACE) 0.1 MG/GM vaginal cream, insert 2-3 x a week, Disp: , Rfl:     Estrogens Conjugated (Premarin) 0.625 MG/GM vaginal cream, , Disp: , Rfl:     fexofenadine (ALLEGRA) 180 MG tablet, Take 1 tablet by mouth Daily., Disp: , Rfl:     levothyroxine (SYNTHROID, LEVOTHROID) 25 MCG tablet, Take 1 tablet by mouth Every Morning., Disp: , Rfl:     lovastatin (MEVACOR) 20 MG tablet, Take 1 tablet by mouth Daily., Disp: , Rfl:     metoprolol tartrate (LOPRESSOR) 25 MG tablet, Take 1 tablet by mouth As Needed. For HR > 100, Disp: , Rfl:     mirtazapine (REMERON) 15 MG tablet, Take 1 tablet by mouth Every Night., Disp: , Rfl:     naloxone (NARCAN) 4 MG/0.1ML nasal spray, Administer 1 spray into the nostril(s) as directed by provider As Needed for Opioid Reversal., Disp: , Rfl:     nitroglycerin (NITROSTAT) 0.4 MG SL tablet, DISSOLVE ONE (1) TABLET(S) BY MOUTH UNDER TONGUE EVERY 5 MINUTES AS NEEDED FOR CHEST PAIN; IF PAIN PERSISTS AFTER 3 TABS IN 15 MIN, CALL 91, Disp: , Rfl:     oxyCODONE-acetaminophen (PERCOCET)  MG per tablet, Take 1 tablet by mouth 3 (Three) Times a Day., Disp: , Rfl:     pantoprazole (PROTONIX) 40 MG EC tablet, Take 1 tablet by mouth Daily., Disp: , Rfl:     predniSONE (DELTASONE) 10 MG tablet, Take 1 tablet by mouth As Needed., Disp: , Rfl:     promethazine (PHENERGAN) 12.5 MG tablet, Take 1 tablet by mouth As Needed for Nausea., Disp: , Rfl:     sucralfate (CARAFATE) 1 g tablet, Take 1 tablet by mouth 4 (Four) Times a Day. 1 hour before meals, Disp: , Rfl:     traZODone (DESYREL) 150 MG tablet, Take 1 tablet by mouth Every Night., Disp: , Rfl:     venlafaxine XR (EFFEXOR-XR) 37.5 MG 24 hr capsule, Take 1 capsule by mouth Daily., Disp: , Rfl:      Laboratory Results:                              Brief Urine Lab Results       None          Microbiology Results (last 10 days)       ** No results found for the last 240 hours. **             Diagnostic Imaging: I reviewed and independently interpreted the new imaging.     Assessment/Plan:  This is a 59-year-old female who underwent flow diversion treatment of an unruptured right internal carotid artery aneurysm approximately 5 months ago.  Neurologically, she has remained stable and is not having any concerning stroke or TIA-like symptoms.  She has been taking her aspirin and Plavix appropriately.  I would like for us to arrange a follow-up angiogram at 6 months from her treatment.  On today's visit, I reviewed the risks and benefits associated with a diagnostic angiogram which include but are not limited to bleeding, infection, vascular injury and stroke.  We will plan for a right radial approach.  We will look to get her scheduled in April.    Diagnoses and all orders for this visit:    1. Cerebral aneurysm, nonruptured (Primary)      Johanne Reyes  reports that she has been smoking cigarettes. She has been exposed to tobacco smoke. She has never used smokeless tobacco.         Hilton Menendez MD  03/13/25  10:22 EDT

## 2025-03-17 DIAGNOSIS — I67.1 CEREBRAL ANEURYSM, NONRUPTURED: Primary | ICD-10-CM

## 2025-04-14 ENCOUNTER — TELEPHONE (OUTPATIENT)
Dept: NEUROSURGERY | Facility: CLINIC | Age: 60
End: 2025-04-14
Payer: MEDICAID

## 2025-04-14 NOTE — TELEPHONE ENCOUNTER
Provider:  Shon  Surgery/Procedure:  Carotid cerebral angiogram  Surgery/Procedure Date:  4/15/25  Last visit:   3/13/25  Next visit: NA     Reason for call:  Patient is scheduled for angiogram tomorrow and called to inform us that she is recovering from an ear infection. She has completed antibiotics and symptoms have resolved - no pain or pressure and no drainage.     She also asks if she could take Invermectin (Horse Paste) today? She takes this for her bowels.

## 2025-04-15 ENCOUNTER — HOSPITAL ENCOUNTER (OUTPATIENT)
Facility: HOSPITAL | Age: 60
Setting detail: HOSPITAL OUTPATIENT SURGERY
Discharge: HOME | End: 2025-04-15
Attending: STUDENT IN AN ORGANIZED HEALTH CARE EDUCATION/TRAINING PROGRAM | Admitting: STUDENT IN AN ORGANIZED HEALTH CARE EDUCATION/TRAINING PROGRAM
Payer: MEDICAID

## 2025-04-15 VITALS
BODY MASS INDEX: 22.35 KG/M2 | TEMPERATURE: 97.1 F | WEIGHT: 142.4 LBS | OXYGEN SATURATION: 96 % | HEART RATE: 76 BPM | DIASTOLIC BLOOD PRESSURE: 67 MMHG | RESPIRATION RATE: 18 BRPM | HEIGHT: 67 IN | SYSTOLIC BLOOD PRESSURE: 106 MMHG

## 2025-04-15 DIAGNOSIS — I67.1 CEREBRAL ANEURYSM, NONRUPTURED: ICD-10-CM

## 2025-04-15 PROCEDURE — 25010000002 FENTANYL CITRATE (PF) 50 MCG/ML SOLUTION: Performed by: STUDENT IN AN ORGANIZED HEALTH CARE EDUCATION/TRAINING PROGRAM

## 2025-04-15 PROCEDURE — C1887 CATHETER, GUIDING: HCPCS | Performed by: STUDENT IN AN ORGANIZED HEALTH CARE EDUCATION/TRAINING PROGRAM

## 2025-04-15 PROCEDURE — 25010000002 HEPARIN (PORCINE) PER 1000 UNITS: Performed by: STUDENT IN AN ORGANIZED HEALTH CARE EDUCATION/TRAINING PROGRAM

## 2025-04-15 PROCEDURE — 25010000002 LIDOCAINE PF 1% 1 % SOLUTION: Performed by: STUDENT IN AN ORGANIZED HEALTH CARE EDUCATION/TRAINING PROGRAM

## 2025-04-15 PROCEDURE — C1769 GUIDE WIRE: HCPCS | Performed by: STUDENT IN AN ORGANIZED HEALTH CARE EDUCATION/TRAINING PROGRAM

## 2025-04-15 PROCEDURE — 25510000002 IODIXANOL PER 1 ML: Performed by: STUDENT IN AN ORGANIZED HEALTH CARE EDUCATION/TRAINING PROGRAM

## 2025-04-15 PROCEDURE — 25010000002 NICARDIPINE 2.5 MG/ML SOLUTION: Performed by: STUDENT IN AN ORGANIZED HEALTH CARE EDUCATION/TRAINING PROGRAM

## 2025-04-15 PROCEDURE — 76937 US GUIDE VASCULAR ACCESS: CPT | Performed by: STUDENT IN AN ORGANIZED HEALTH CARE EDUCATION/TRAINING PROGRAM

## 2025-04-15 RX ORDER — NICARDIPINE HYDROCHLORIDE 2.5 MG/ML
INJECTION INTRAVENOUS
Status: DISCONTINUED | OUTPATIENT
Start: 2025-04-15 | End: 2025-04-15 | Stop reason: HOSPADM

## 2025-04-15 RX ORDER — ACETAMINOPHEN 325 MG/1
650 TABLET ORAL EVERY 4 HOURS PRN
Status: DISCONTINUED | OUTPATIENT
Start: 2025-04-15 | End: 2025-04-15 | Stop reason: HOSPADM

## 2025-04-15 RX ORDER — FENTANYL CITRATE 50 UG/ML
INJECTION, SOLUTION INTRAMUSCULAR; INTRAVENOUS
Status: DISCONTINUED | OUTPATIENT
Start: 2025-04-15 | End: 2025-04-15 | Stop reason: HOSPADM

## 2025-04-15 RX ORDER — HEPARIN SODIUM 1000 [USP'U]/ML
INJECTION, SOLUTION INTRAVENOUS; SUBCUTANEOUS
Status: DISCONTINUED | OUTPATIENT
Start: 2025-04-15 | End: 2025-04-15 | Stop reason: HOSPADM

## 2025-04-15 RX ORDER — LIDOCAINE HYDROCHLORIDE 10 MG/ML
INJECTION, SOLUTION EPIDURAL; INFILTRATION; INTRACAUDAL; PERINEURAL
Status: DISCONTINUED | OUTPATIENT
Start: 2025-04-15 | End: 2025-04-15 | Stop reason: HOSPADM

## 2025-04-15 RX ORDER — IODIXANOL 320 MG/ML
INJECTION, SOLUTION INTRAVASCULAR
Status: DISCONTINUED | OUTPATIENT
Start: 2025-04-15 | End: 2025-04-15 | Stop reason: HOSPADM

## 2025-04-15 NOTE — BRIEF OP NOTE
CV CAROTID CEREBRAL ANGIOGRAM BILATERAL  Progress Note    Johanne Reyes  4/15/2025    Pre-op Diagnosis:   Cerebral aneurysm, nonruptured [I67.1]       Post-Op Diagnosis Codes:     * Cerebral aneurysm, nonruptured [I67.1]    Procedure(s):      Procedure(s):  Carotid Cerebral Angiogram              Surgeon(s):  Hilton Menendez MD    Anesthesia: Local    Staff:   Scrub Person: hTelma Trinidad  Documenter: Yomi Peck  Invasive Nurse: Brodie Ogden RN       Estimated Blood Loss: minimal    Urine Voided: * No values recorded between 4/15/2025  9:31 AM and 4/15/2025  9:55 AM *    Specimens:                None      Drains: * No LDAs found *    Findings: No filling noted into the previously treated right internal carotid artery aneurysm.  There is no evidence of stenosis within the previously placed flow diverting stents.      Complications: None apparent          Hilton Menendez MD     Date: 4/15/2025  Time: 09:59 EDT

## 2025-04-15 NOTE — Clinical Note
A 5 fr sheath was successfully inserted using micropuncture technique with ultrasound guidance into the right radial artery. Sheath insertion not delayed.

## 2025-04-15 NOTE — H&P
"Patient: Johanne Reyes  : 1965    Primary Care Provider: Serge Fu MD    Requesting Provider: As above      Chief Complaint: Cerebral aneurysm    History of Present Illness: This is a 59 y.o. female who presents for follow-up angiogram after previous flow diversion treatment of an unruptured right internal carotid artery aneurysm.  Please see prior notes for full history.    PMHX  Allergies:  Allergies   Allergen Reactions    Sulfa Antibiotics Hives    Wound Dressing Adhesive Rash    Adhesive Tape Itching    Amoxicillin-Pot Clavulanate Other (See Comments) and Nausea Only    Methotrexate Derivatives Other (See Comments)     \"Makes my pancreas growl\"    Metronidazole Nausea Only    Pregabalin Other (See Comments) and Confusion    Sulfamethoxazole-Trimethoprim Hives and Other (See Comments)    Terbinafine Unknown - High Severity     Nostril bleeding    Trimethoprim Other (See Comments)    Vancomycin Confusion and Other (See Comments)    5ht3 Receptor Antagonists Anxiety    Amitriptyline Anxiety    Buprenorphine-Naloxone Anxiety and Other (See Comments)    Butorphanol Anxiety and Other (See Comments)    Cefdinir Anxiety, Nausea Only and Other (See Comments)    Gabapentin Anxiety and Other (See Comments)     Medications  No current facility-administered medications for this encounter.  Past Medical History:  Past Medical History:   Diagnosis Date    A-fib     Aneurysm 2024    Arthritis     Hashimoto's disease     Hyperlipidemia     Hypertension      Past Surgical History:  Past Surgical History:   Procedure Laterality Date    APPENDECTOMY      COLONOSCOPY      FOOT SURGERY Bilateral     INTERVENTIONAL RADIOLOGY PROCEDURE Bilateral 2024    Procedure: Carotid Cerebral Angiogram;  Surgeon: Hilton Menendez MD;  Location: [facility]ATH INVASIVE LOCATION;  Service: Interventional Radiology;  Laterality: Bilateral;    INTERVENTIONAL RADIOLOGY PROCEDURE N/A 10/15/2024    Procedure: Embolization;  " Surgeon: Hilton Menendez MD;  Location: [facility] INVASIVE LOCATION;  Service: Interventional Radiology;  Laterality: N/A;    LAPAROSCOPIC TOTAL HYSTERECTOMY      STOMACH SURGERY      UVULECTOMY       Social Hx:  Social History     Tobacco Use    Smoking status: Every Day     Current packs/day: 0.25     Types: Cigarettes     Passive exposure: Current    Smokeless tobacco: Never    Tobacco comments:     4-5 CIGS A DAY AS OF 10/09/2024 PER PATIENT    Vaping Use    Vaping status: Never Used   Substance Use Topics    Alcohol use: Not Currently    Drug use: Never     Family Hx:  No family history on file.  Review of Systems:        negative    Physical Exam:   There were no vitals taken for this visit.  Patient appears comfortable, resting  Awake, alert and oriented x 3  Speech fluent/clear  Opens eyes spont  Pupils 3 mm reactive bilaterally  Extraocular muscles intact bilaterally  Normal sensation to light touch in all 3 distributions of CN V bilaterally  Face symmetric bilaterally  Tongue midline  5/5 in all 4 ext    Intake/Output: No intake or output data in the 24 hours ending 04/15/25 0811    Current Medications: No current facility-administered medications for this encounter.     Laboratory Results:                              Brief Urine Lab Results       None          Microbiology Results (last 10 days)       ** No results found for the last 240 hours. **             Diagnostic Imaging: The patient's diagnostic imaging was independently reviewed and interpreted by myself.    Assessment/Plan:    1. Cerebral aneurysm, nonruptured         This is a 59-year-old female here for follow-up angiogram of a previously treated unruptured right internal carotid artery aneurysm.      Hilton Menendez MD  04/15/25  08:11 EDT

## 2025-07-13 ENCOUNTER — APPOINTMENT (OUTPATIENT)
Dept: GENERAL RADIOLOGY | Age: 60
End: 2025-07-13
Payer: MEDICAID

## 2025-07-13 ENCOUNTER — HOSPITAL ENCOUNTER (EMERGENCY)
Age: 60
Discharge: HOME | End: 2025-07-13
Attending: EMERGENCY MEDICINE
Payer: MEDICAID

## 2025-07-13 VITALS
SYSTOLIC BLOOD PRESSURE: 115 MMHG | HEIGHT: 67 IN | BODY MASS INDEX: 22.15 KG/M2 | DIASTOLIC BLOOD PRESSURE: 76 MMHG | RESPIRATION RATE: 16 BRPM | HEART RATE: 70 BPM | WEIGHT: 141.09 LBS | OXYGEN SATURATION: 98 % | TEMPERATURE: 98.6 F

## 2025-07-13 DIAGNOSIS — J06.9 ACUTE UPPER RESPIRATORY INFECTION: Primary | ICD-10-CM

## 2025-07-13 LAB
ALBUMIN SERPL-MCNC: 4.5 G/DL (ref 3.4–5)
ALBUMIN/GLOB SERPL: 2.1 {RATIO} (ref 1.1–2.2)
ALP SERPL-CCNC: 46 U/L (ref 40–129)
ALT SERPL-CCNC: 16 U/L (ref 10–40)
ANION GAP SERPL CALCULATED.3IONS-SCNC: 10 MMOL/L (ref 3–16)
AST SERPL-CCNC: 21 U/L (ref 15–37)
BASOPHILS # BLD: 0 K/UL (ref 0–0.2)
BASOPHILS NFR BLD: 0.3 %
BILIRUB SERPL-MCNC: 0.3 MG/DL (ref 0–1)
BILIRUB UR QL STRIP.AUTO: NEGATIVE
BUN SERPL-MCNC: 8 MG/DL (ref 7–20)
CALCIUM SERPL-MCNC: 9 MG/DL (ref 8.3–10.6)
CHLORIDE SERPL-SCNC: 104 MMOL/L (ref 99–110)
CK SERPL-CCNC: 107 U/L (ref 26–192)
CLARITY UR: CLEAR
CO2 SERPL-SCNC: 23 MMOL/L (ref 21–32)
COLOR UR: YELLOW
CREAT SERPL-MCNC: 0.8 MG/DL (ref 0.6–1.1)
DEPRECATED RDW RBC AUTO: 12.3 % (ref 12.4–15.4)
EOSINOPHIL # BLD: 0.1 K/UL (ref 0–0.6)
EOSINOPHIL NFR BLD: 1.2 %
EPI CELLS #/AREA URNS HPF: NORMAL /HPF (ref 0–5)
GFR SERPLBLD CREATININE-BSD FMLA CKD-EPI: 85 ML/MIN/{1.73_M2}
GLUCOSE SERPL-MCNC: 93 MG/DL (ref 70–99)
GLUCOSE UR STRIP.AUTO-MCNC: NEGATIVE MG/DL
HCT VFR BLD AUTO: 36.6 % (ref 36–48)
HGB BLD-MCNC: 12.3 G/DL (ref 12–16)
HGB UR QL STRIP.AUTO: ABNORMAL
IMM GRANULOCYTES # BLD: 0 K/UL (ref 0–0.2)
IMM GRANULOCYTES NFR BLD: 0 %
KETONES UR STRIP.AUTO-MCNC: NEGATIVE MG/DL
LEUKOCYTE ESTERASE UR QL STRIP.AUTO: NEGATIVE
LIPASE SERPL-CCNC: 31 U/L (ref 13–60)
LYMPHOCYTES # BLD: 2.5 K/UL (ref 1–5.1)
LYMPHOCYTES NFR BLD: 42.1 %
MCH RBC QN AUTO: 31.1 PG (ref 26–34)
MCHC RBC AUTO-ENTMCNC: 33.6 G/DL (ref 32–36.4)
MCV RBC AUTO: 92.4 FL (ref 80–100)
MONOCYTES # BLD: 0.4 K/UL (ref 0–1.3)
MONOCYTES NFR BLD: 6.6 %
NEUTROPHILS # BLD: 3 K/UL (ref 1.7–7.7)
NEUTROPHILS NFR BLD: 49.8 %
NITRITE UR QL STRIP.AUTO: NEGATIVE
PH UR STRIP.AUTO: 5.5 [PH] (ref 5–8)
PLATELET # BLD AUTO: 247 K/UL (ref 135–450)
PMV BLD AUTO: 9.6 FL (ref 5–10.5)
POTASSIUM SERPL-SCNC: 3.7 MMOL/L (ref 3.5–5.1)
PROT SERPL-MCNC: 6.6 G/DL (ref 6.4–8.2)
PROT UR STRIP.AUTO-MCNC: NEGATIVE MG/DL
RBC # BLD AUTO: 3.96 M/UL (ref 4–5.2)
RBC #/AREA URNS HPF: NORMAL /HPF (ref 0–4)
SODIUM SERPL-SCNC: 137 MMOL/L (ref 136–145)
SP GR UR STRIP.AUTO: <=1.005 (ref 1–1.03)
UA COMPLETE W REFLEX CULTURE PNL UR: ABNORMAL
UA DIPSTICK W REFLEX MICRO PNL UR: YES
URN SPEC COLLECT METH UR: ABNORMAL
UROBILINOGEN UR STRIP-ACNC: 0.2 E.U./DL
WBC # BLD AUTO: 6 K/UL (ref 4–11)
WBC #/AREA URNS HPF: NORMAL /HPF (ref 0–5)

## 2025-07-13 PROCEDURE — 71045 X-RAY EXAM CHEST 1 VIEW: CPT

## 2025-07-13 PROCEDURE — 99284 EMERGENCY DEPT VISIT MOD MDM: CPT

## 2025-07-13 PROCEDURE — 36415 COLL VENOUS BLD VENIPUNCTURE: CPT

## 2025-07-13 PROCEDURE — 83690 ASSAY OF LIPASE: CPT

## 2025-07-13 PROCEDURE — 82550 ASSAY OF CK (CPK): CPT

## 2025-07-13 PROCEDURE — 85025 COMPLETE CBC W/AUTO DIFF WBC: CPT

## 2025-07-13 PROCEDURE — 80053 COMPREHEN METABOLIC PANEL: CPT

## 2025-07-13 PROCEDURE — 81001 URINALYSIS AUTO W/SCOPE: CPT

## 2025-07-13 RX ORDER — CYCLOBENZAPRINE HCL 10 MG
10 TABLET ORAL NIGHTLY
COMMUNITY
Start: 2025-06-23

## 2025-07-13 RX ORDER — METOPROLOL TARTRATE 25 MG/1
TABLET, FILM COATED ORAL
COMMUNITY
Start: 2024-07-29

## 2025-07-13 RX ORDER — ESCITALOPRAM OXALATE 20 MG/1
TABLET ORAL
COMMUNITY
Start: 2025-05-13

## 2025-07-13 RX ORDER — VALACYCLOVIR HYDROCHLORIDE 500 MG/1
TABLET, FILM COATED ORAL
COMMUNITY
Start: 2025-05-05

## 2025-07-13 RX ORDER — MIRTAZAPINE 15 MG/1
TABLET, FILM COATED ORAL
COMMUNITY
Start: 2025-06-23

## 2025-07-13 RX ORDER — PROMETHAZINE HYDROCHLORIDE 12.5 MG/1
12.5 TABLET ORAL EVERY 8 HOURS PRN
COMMUNITY
Start: 2025-06-23

## 2025-07-13 RX ORDER — BACLOFEN 10 MG/1
TABLET ORAL
COMMUNITY

## 2025-07-13 RX ORDER — LEVOTHYROXINE SODIUM 25 UG/1
TABLET ORAL
COMMUNITY
Start: 2025-05-13

## 2025-07-13 RX ORDER — VENLAFAXINE HYDROCHLORIDE 37.5 MG/1
37.5 CAPSULE, EXTENDED RELEASE ORAL DAILY
COMMUNITY
Start: 2024-08-07 | End: 2026-05-13

## 2025-07-13 RX ORDER — CLOPIDOGREL BISULFATE 75 MG/1
TABLET ORAL
COMMUNITY
Start: 2025-06-30

## 2025-07-13 RX ORDER — GUAIFENESIN AND DEXTROMETHORPHAN HYDROBROMIDE 600; 30 MG/1; MG/1
1 TABLET, EXTENDED RELEASE ORAL 2 TIMES DAILY
Qty: 14 TABLET | Refills: 0 | Status: SHIPPED | OUTPATIENT
Start: 2025-07-13

## 2025-07-13 RX ORDER — ONDANSETRON 4 MG/1
4 TABLET, ORALLY DISINTEGRATING ORAL 3 TIMES DAILY PRN
Qty: 21 TABLET | Refills: 0 | Status: SHIPPED | OUTPATIENT
Start: 2025-07-13

## 2025-07-13 RX ORDER — NITROGLYCERIN 0.4 MG/1
TABLET SUBLINGUAL
COMMUNITY
Start: 2025-06-23

## 2025-07-13 RX ORDER — ASPIRIN 81 MG/1
81 TABLET, CHEWABLE ORAL DAILY
COMMUNITY
Start: 2024-10-17

## 2025-07-13 RX ORDER — DIAZEPAM 5 MG/1
5 TABLET ORAL NIGHTLY PRN
COMMUNITY
Start: 2025-05-13

## 2025-07-13 RX ORDER — TRAZODONE HYDROCHLORIDE 100 MG/1
TABLET ORAL
COMMUNITY
Start: 2025-05-13

## 2025-07-13 RX ORDER — OXYCODONE AND ACETAMINOPHEN 10; 325 MG/1; MG/1
1 TABLET ORAL 3 TIMES DAILY
COMMUNITY
Start: 2025-06-23

## 2025-07-13 RX ORDER — DICLOFENAC SODIUM 30 MG/G
GEL TOPICAL 2 TIMES DAILY
COMMUNITY
Start: 2025-05-13

## 2025-07-13 RX ORDER — ALBUTEROL SULFATE 0.83 MG/ML
SOLUTION RESPIRATORY (INHALATION)
COMMUNITY

## 2025-07-13 RX ORDER — IBUPROFEN 600 MG/1
600 TABLET, FILM COATED ORAL EVERY 8 HOURS PRN
COMMUNITY
Start: 2025-06-23

## 2025-07-13 RX ORDER — DICYCLOMINE HCL 20 MG
20 TABLET ORAL 4 TIMES DAILY PRN
COMMUNITY
Start: 2025-05-13 | End: 2026-05-13

## 2025-07-13 ASSESSMENT — LIFESTYLE VARIABLES
HOW MANY STANDARD DRINKS CONTAINING ALCOHOL DO YOU HAVE ON A TYPICAL DAY: PATIENT DOES NOT DRINK
HOW OFTEN DO YOU HAVE A DRINK CONTAINING ALCOHOL: NEVER

## 2025-07-13 ASSESSMENT — PAIN - FUNCTIONAL ASSESSMENT: PAIN_FUNCTIONAL_ASSESSMENT: 0-10

## 2025-07-13 ASSESSMENT — PAIN SCALES - GENERAL: PAINLEVEL_OUTOF10: 7

## 2025-07-13 NOTE — ED PROVIDER NOTES
CHIEF COMPLAINT  Chief Complaint   Patient presents with    multiple complaints/ fatigue     Presents w multiple c/o fatigue/ pain all over her body/ lower back/ fever today/ took her oxycotin @ 9a / reports a cough as well       HISTORY OF PRESENT ILLNESS  Jessi Arceo is a 59 y.o. female who presents to the ED complaining of a 5-day history of fatigue, myalgias, low back pain, tactile fever and cough with 1 episode of nonbloody diarrhea and some mild abdominal cramping earlier that has now resolved.  Patient has a history of fibromyalgia.  No vomiting.  No current abdominal pain.  No urinary symptoms of dysuria, hematuria.  No vaginal bleeding or vaginal discharge.  History of hysterectomy.  Patient reports very mild headache but no neck pain.  No sore throat.  No shortness of breath.  No hemoptysis.  No sputum production.    No other complaints, modifying factors or associated symptoms.     Nursing notes reviewed.   Past Medical History:   Diagnosis Date    Fibromyalgia     Lupus (systemic lupus erythematosus) (HCC)     RA (rheumatoid arthritis) ([facility])      Past Surgical History:   Procedure Laterality Date    HYSTERECTOMY (CERVIX STATUS UNKNOWN)       History reviewed. No pertinent family history.  Social History     Socioeconomic History    Marital status: Single     Spouse name: Not on file    Number of children: Not on file    Years of education: Not on file    Highest education level: Not on file   Occupational History    Not on file   Tobacco Use    Smoking status: Every Day     Types: Cigarettes    Smokeless tobacco: Never   Vaping Use    Vaping status: Never Used   Substance and Sexual Activity    Alcohol use: Not Currently    Drug use: Never    Sexual activity: Defer   Other Topics Concern    Not on file   Social History Narrative    Not on file     Social Drivers of Health     Financial Resource Strain: High Risk (6/23/2025)    Received from [facility]    Overall Financial Resource Strain ([facility])

## (undated) DEVICE — GLIDESHEATH SLENDER STAINLESS STEEL KIT: Brand: GLIDESHEATH SLENDER

## (undated) DEVICE — ANGIO-SEAL VIP VASCULAR CLOSURE DEVICE: Brand: ANGIO-SEAL

## (undated) DEVICE — RADIFOCUS GLIDEWIRE: Brand: GLIDEWIRE

## (undated) DEVICE — CVR PROB ULTRASND/TRANSD W/GEL 7X11IN STRL

## (undated) DEVICE — DISTAL ACCESS CATHETER: Brand: AXS CATALYST 5

## (undated) DEVICE — PINNACLE INTRODUCER SHEATH: Brand: PINNACLE

## (undated) DEVICE — STPCK LP 1WY RA 200PSI

## (undated) DEVICE — RADIFOCUS TORQUE DEVICE MULTI-TORQUE VISE: Brand: RADIFOCUS TORQUE DEVICE

## (undated) DEVICE — LEX NEURO ANGIOGRAPHY: Brand: MEDLINE INDUSTRIES, INC.

## (undated) DEVICE — RADIFOCUS GLIDECATH: Brand: GLIDECATH

## (undated) DEVICE — ST ACC MICROPUNCTURE .018 TRANSLSS/SS/TP 5F/10CM 21G/7CM

## (undated) DEVICE — Device

## (undated) DEVICE — 1 TIP STRAIGHT MICROCATHETER: Brand: EXCELSIOR XT-27

## (undated) DEVICE — LONG SHEATH: Brand: AXS INFINITY LS

## (undated) DEVICE — TR BAND RADIAL ARTERY COMPRESSION DEVICE: Brand: TR BAND

## (undated) DEVICE — LIMB HOLDER, WRIST/ANKLE: Brand: DEROYAL

## (undated) DEVICE — NEURO GUIDEWIRE WITH HYDROPHILIC COATING: Brand: SYNCHRO 14

## (undated) DEVICE — ROTATING HEMOSTATIC VALVE .096": Brand: RHV

## (undated) DEVICE — STPCK 3/WY HP M/RA W/OFF/HNDL 1050PSI STRL